# Patient Record
Sex: FEMALE | Race: WHITE
[De-identification: names, ages, dates, MRNs, and addresses within clinical notes are randomized per-mention and may not be internally consistent; named-entity substitution may affect disease eponyms.]

---

## 2021-07-20 ENCOUNTER — HOSPITAL ENCOUNTER (EMERGENCY)
Dept: HOSPITAL 75 - ER FS | Age: 26
Discharge: HOME | End: 2021-07-20
Payer: SELF-PAY

## 2021-07-20 VITALS — HEIGHT: 65 IN | BODY MASS INDEX: 25.64 KG/M2 | WEIGHT: 153.88 LBS

## 2021-07-20 VITALS — DIASTOLIC BLOOD PRESSURE: 70 MMHG | SYSTOLIC BLOOD PRESSURE: 124 MMHG

## 2021-07-20 DIAGNOSIS — H65.23: Primary | ICD-10-CM

## 2021-07-20 PROCEDURE — 99282 EMERGENCY DEPT VISIT SF MDM: CPT

## 2021-07-20 NOTE — ED EENT
History of Present Illness


General


Chief Complaint:  Ear Problems


Stated Complaint:  RT EAR PAIN


Nursing Triage Note:  


Patient presents to ED with parent reporting R ear pain since last night and 


causing lack of rest. Last medication used was Ibufrofen at 0200 and not 


relieved. Report hx of ear pain twice in last month and once treated with 


antibiotic and once no meds so refusing to return to PCP today.


Source:  patient





History of Present Illness


Date Seen by Provider:  Jul 20, 2021


Time Seen by Provider:  10:15


Initial Comments


Patient is a 25-year-old female who presents with intermittent bilateral ear 

pain for the past several weeks.  Patient has been evaluated by her PCP and and 

this ER.  She has been prescribed pain medications and antibiotics.  She states 

the pain will improve and return.  She reports dull right ear pain recurring 

last night.  She has taken ibuprofen with minimal relief.  Pain is rated as 

moderate and radiates to right mandible.  She has some nasal congestion and 

sinus drainage.  She denies otorrhea, sinus and dental tenderness and was 

evaluated by her dentist 1 week ago.


Timing/Duration:  gradual


Severity:  moderate


Location:  other


Prearrival Treatment:  other


Modifying Factors:  Improves With Other


Associated Symptoms:  other





Allergies and Home Medications


Patient Home Medication List


Home Medication List Reviewed:  Yes





Review of Systems


Review of Systems


Constitutional:  see HPI


Eyes:  See HPI


Ears:  See HPI


Nose:  see HPI


Mouth:  see HPI


Throat:  see HPI


Respiratory:  see HPI


Cardiovascular:  see HPI


Gastrointestinal:  see HPI


Musculoskeletal:  see HPI


Skin:  see HPI


Neurological:  See HPI


Hematologic/Lymphatic:  See HPI


Immunological/Allergic:  see HPI





Past Medical-Social-Family Hx


Patient Social History


Tobacco Use?:  No


Smoking Status:  Never a Smoker


Substance use?:  No


Alcohol Use?:  Yes


Alcohol Frequency:  Rarely


Pt feels they are or have been:  No





Past Medical History


Surgery/Hospitalization HX:  


Hx hyperactive thyroid


Last Menstrual Period:  Jul 16, 2021





Visual Acuity :  


   Eye Location:  Bilaterally


   Vision Acuity Degree:  


Physical Exam


Vital Signs





Vital Signs - First Documented








 7/20/21





 10:42


 


Temp 36.4


 


Pulse 92


 


Resp 16


 


B/P (MAP) 126/73 (90)


 


Pulse Ox 100


 


O2 Delivery Room Air








Height, Weight, BMI


Height: '"


Weight: lbs. oz. kg; 25.00 BMI


Method:


General Appearance:  moderate distress


Eyes:  bilateral eye normal inspection, bilateral eye PERRL, bilateral eye EOMI,

bilateral eye abnormal EOM, bilateral eye abnormal pupil, bilateral eye A-V 

nicking, bilateral eye conjunctival hemorrhage, bilateral eye conjunctival 

inflammation, bilateral eye conjunctivae pale, bilateral eye corneal abrasion


Ears:  left ear TM dull, left ear TM bulging, left ear other (blo)





Progress/Results/Core Measures


Results/Orders


Vital Signs/I&O











 7/20/21





 10:42


 


Temp 36.4


 


Pulse 92


 


Resp 16


 


B/P (MAP) 126/73 (90)


 


Pulse Ox 100


 


O2 Delivery Room Air














Blood Pressure Mean:                    90











Departure


Communication (Admissions)


Chronic otitis media with serous effusions.  Recommendations are supportive care

with PCP follow-up.  Return precautions reviewed.  Patient verbalizes 

understanding agreement discharge instructions prior to departure





Impression





   Primary Impression:  


   Chronic otitis media with serous effusion


Disposition:  01 HOME, SELF-CARE


Condition:  Stable





Departure-Patient Inst.


Decision time for Depature:  12:08


Referrals:  


[facility] (PCP)


Primary Care Physician








SAVANAH LUNA (Family)


Primary Care Physician


Patient Instructions:  Serous Otitis Media





Add. Discharge Instructions:  


Please take newly prescribed medications as directed and follow-up with your PCP

in 1 week for reevaluation.





All discharge instructions reviewed with patient and/or family. Voiced 

understanding.


Scripts


Fexofenadine/Pseudoephedrine (Allegra-D 24 Hour Tablet) 1 Each Tab.er.24h


1 EACH PO DAILY for 14 Days, TAB


   Prov: REHAN ACE DO         7/20/21 


Tramadol HCl (Tramadol HCl) 50 Mg Tablet


50 MG PO Q6H PRN for PAIN for 3 Days, #10 TAB 0 Refills


   Prov: REHAN ACE DO         7/20/21











REHAN ACE DO                   Jul 20, 2021 11:47

## 2021-09-17 ENCOUNTER — HOSPITAL ENCOUNTER (OUTPATIENT)
Dept: HOSPITAL 75 - CARD | Age: 26
End: 2021-09-17
Attending: NURSE PRACTITIONER
Payer: SELF-PAY

## 2021-09-17 DIAGNOSIS — R00.2: Primary | ICD-10-CM

## 2021-09-17 PROCEDURE — 93226 XTRNL ECG REC<48 HR SCAN A/R: CPT

## 2021-09-17 PROCEDURE — 93225 XTRNL ECG REC<48 HRS REC: CPT

## 2022-06-17 ENCOUNTER — HOSPITAL ENCOUNTER (EMERGENCY)
Dept: HOSPITAL 75 - ER FS | Age: 27
LOS: 1 days | Discharge: HOME | End: 2022-06-18
Payer: SELF-PAY

## 2022-06-17 VITALS — WEIGHT: 184.09 LBS | BODY MASS INDEX: 31.43 KG/M2 | HEIGHT: 63.98 IN

## 2022-06-17 DIAGNOSIS — R10.2: ICD-10-CM

## 2022-06-17 DIAGNOSIS — O26.891: Primary | ICD-10-CM

## 2022-06-17 PROCEDURE — 99282 EMERGENCY DEPT VISIT SF MDM: CPT

## 2022-06-17 PROCEDURE — 84703 CHORIONIC GONADOTROPIN ASSAY: CPT

## 2022-06-17 PROCEDURE — 81000 URINALYSIS NONAUTO W/SCOPE: CPT

## 2022-06-18 VITALS — DIASTOLIC BLOOD PRESSURE: 87 MMHG | SYSTOLIC BLOOD PRESSURE: 146 MMHG

## 2022-06-18 LAB
APTT PPP: YELLOW S
BACTERIA #/AREA URNS HPF: (no result) /HPF
BILIRUB UR QL STRIP: NEGATIVE
FIBRINOGEN PPP-MCNC: (no result) MG/DL
GLUCOSE UR STRIP-MCNC: NEGATIVE MG/DL
KETONES UR QL STRIP: NEGATIVE
LEUKOCYTE ESTERASE UR QL STRIP: NEGATIVE
NITRITE UR QL STRIP: NEGATIVE
PH UR STRIP: 6 [PH] (ref 5–9)
PROT UR QL STRIP: NEGATIVE
RBC #/AREA URNS HPF: (no result) /HPF
SP GR UR STRIP: >=1.03 (ref 1.02–1.02)
SQUAMOUS #/AREA URNS HPF: (no result) /HPF
WBC #/AREA URNS HPF: (no result) /HPF

## 2022-06-18 NOTE — ED GU-FEMALE
General


Chief Complaint:   - Reproductive


Stated Complaint:  LOWER ABDOMINAL PAIN/5 WEEK PREG


Nursing Triage Note:  


Patient states that she found out she was pregnant last week.  She reports 


feeling cramping that makes her uncomfortable.  Patient denies having any 


bleeding.  Patient reports that she thinks she is about 5 weeks pregnant.  


Patient would like to be checked out.


Source:  patient


Exam Limitations:  no limitations





History of Present Illness


Date Seen by Provider:  Jun 18, 2022


Time Seen by Provider:  00:10


Initial Comments


Patient is approximately 5 weeks pregnant reports occasional mitten cramping for

the past month.  Patient states she occasionally feels uncomfortable sharp pain.

 She currently denies pain or discomfort.  No urinary frequency urgency dysuria.

 No vaginal bleeding.  No flank pain.  No nausea or vomiting.  Patient did not 

take any Tylenol medication prior to arrival.  No other acute symptoms or 

complaints.


Timing/Duration:  week


Severity/Quality:  other


Location:  other


Radiation:  urethral


Activities at Onset:  other


Sexual Gene Aut[place] History:  other


Associated Symptoms:  other





Allergies and Home Medications


Allergies


Coded Allergies:  


     azithromycin (Verified  Allergy, Unknown, 6/18/22)





Patient Home Medication List


Home Medication List Reviewed:  Yes


Fexofenadine/Pseudoephedrine (Allegra-D 24 Hour Tablet) 1 Each Tab.er.24h, 1 

EACH PO DAILY


   Prescribed by: REHAN ACE on 7/20/21 1214


Ondansetron (Ondansetron Odt) 4 Mg Tab.rapdis, 4 MG PO Q6H


   Prescribed by: REHAN ACE on 7/20/21 1324


Tramadol HCl (Tramadol HCl) 50 Mg Tablet, 50 MG PO Q6H PRN for PAIN


   Prescribed by: REHAN ACE on 7/20/21 1214





Review of Systems


Review of Systems


Constitutional:  see HPI


Genitourinary:  see HPI





Past Medical-Social-Family Hx


Patient Social History


Tobacco Use?:  No


Substance use?:  No


Alcohol Use?:  No


Pt feels they are or have been:  No





Past Medical History


Surgery/Hospitalization HX:  


Hx hyperactive thyroid





Physical Exam


Vital Signs





Vital Signs - First Documented








 6/18/22





 00:04


 


Temp 37.1


 


Pulse 110


 


Resp 18


 


B/P (MAP) 146/87 (106)


 


O2 Delivery Room Air





Capillary Refill : Less Than 3 Seconds


Height, Weight, BMI


Height: '"


Weight: lbs. oz. kg; 31.00 BMI


Method:


General Appearance:  WD/WN, no apparent distress


Gastrointestinal:  non tender, soft





Focused Exam


Sepsis Stage:  Ruled Out





Progress/Results/Core Measures


Suspected Sepsis


SIRS


Temperature: 


Pulse: 110 


Respiratory Rate: 18


 


Blood Pressure 146 /87 


Mean: 106





Results/Orders


Lab Results





Laboratory Tests








Test


 6/18/22


00:10 Range/Units


 


 


Urine Color YELLOW   


 


Urine Clarity


 SLIGHTLY


CLOUDY  





 


Urine pH 6.0  5-9  


 


Urine Specific Gravity >=1.030  1.016-1.022  


 


Urine Protein NEGATIVE  NEGATIVE  


 


Urine Glucose (UA) NEGATIVE  NEGATIVE  


 


Urine Ketones NEGATIVE  NEGATIVE  


 


Urine Nitrite NEGATIVE  NEGATIVE  


 


Urine Bilirubin NEGATIVE  NEGATIVE  


 


Urine Urobilinogen 0.2  < = 1.0  MG/DL


 


Urine Leukocyte Esterase NEGATIVE  NEGATIVE  


 


Urine RBC (Auto) TRACE-I H NEGATIVE  


 


Urine RBC NONE   /HPF


 


Urine WBC 5-10 H  /HPF


 


Urine Squamous Epithelial


Cells 25-50 H


  /HPF





 


Urine Crystals NONE   /LPF


 


Urine Bacteria MODERATE H  /HPF


 


Urine Casts NONE   /LPF


 


Urine Mucus NEGATIVE   /LPF


 


Urine Culture Indicated NO   








My Orders





Orders - REHAN ACE DO


Ua Culture If Indicated (6/18/22 00:16)


Urinalysis (6/18/22 00:10)





Vital Signs/I&O











 6/18/22





 00:04


 


Temp 37.1


 


Pulse 110


 


Resp 18


 


B/P (MAP) 146/87 (106)


 


O2 Delivery Room Air





Capillary Refill : Less Than 3 Seconds








Blood Pressure Mean:                    106











Departure


Communication (Admissions)


No pelvic pain/tenderness on exam.  UA negative.  Recommendations are for 

supportive measures and PCP/OB follow-up.  Return precautions reviewed





Impression





   Primary Impression:  


   Pelvic cramping


   Additional Impression:  


   Early stage of pregnancy


Disposition:  01 HOME, SELF-CARE


Condition:  Stable





Departure-Patient Inst.


Decision time for Depature:  01:02


Referrals:  


[facility] (PCP)


Primary Care Physician








SAVANAH LUNA (Family)


Primary Care Physician


Patient Instructions:  Pelvic Pain





Add. Discharge Instructions:  


Please take Tylenol for cramping and apply heating pad if needed.  Follow-up 

with your PCP or OB early next week for reevaluation.  Return to the ED if new 

or worsening symptoms





All discharge instructions reviewed with patient and/or family. Voiced 

understanding.











REHAN ACE DO                   Jun 18, 2022 01:04

## 2022-11-03 ENCOUNTER — HOSPITAL ENCOUNTER (EMERGENCY)
Dept: HOSPITAL 75 - ER FS | Age: 27
Discharge: HOME | End: 2022-11-03
Payer: MEDICAID

## 2022-11-03 VITALS — SYSTOLIC BLOOD PRESSURE: 128 MMHG | DIASTOLIC BLOOD PRESSURE: 87 MMHG

## 2022-11-03 DIAGNOSIS — Z28.310: ICD-10-CM

## 2022-11-03 DIAGNOSIS — O21.9: ICD-10-CM

## 2022-11-03 DIAGNOSIS — R19.7: ICD-10-CM

## 2022-11-03 DIAGNOSIS — Z3A.26: ICD-10-CM

## 2022-11-03 DIAGNOSIS — O99.891: ICD-10-CM

## 2022-11-03 DIAGNOSIS — R05.1: ICD-10-CM

## 2022-11-03 DIAGNOSIS — R00.0: ICD-10-CM

## 2022-11-03 DIAGNOSIS — O98.512: Primary | ICD-10-CM

## 2022-11-03 DIAGNOSIS — Z20.822: ICD-10-CM

## 2022-11-03 DIAGNOSIS — J02.9: ICD-10-CM

## 2022-11-03 PROCEDURE — 87636 SARSCOV2 & INF A&B AMP PRB: CPT

## 2022-11-03 NOTE — ED GI
General


Chief Complaint:  Abdominal/GI Problems


Stated Complaint:  VOMITING


Source of Information:  Patient


Exam Limitations:  No Limitations





History of Present Illness


Date Seen by Provider:  Nov 3, 2022


Time Seen by Provider:  10:33


Initial Comments


27-year-old female  at 27 WGA coming in due to 3 days of cough, sore throat,

elevated temperature, nausea, and vomiting.  Went to her primary doctor 

yesterday and had a negative strep test.  Had a negative at home COVID test as 

well.  Has not had anything for fever or pain as of yet today.  Has not had any 

complications with the pregnancy, no vaginal bleeding, feels baby moving, no 

contractions, and has had normal work-up thus far.  One of her friends did have 

strep throat recently she believes.  She is tolerating fluids at home but is 

drinking slightly less.  She is otherwise denying any other acute complaints.





Allergies and Home Medications


Allergies


Coded Allergies:  


     azithromycin (Verified  Allergy, Unknown, 22)





Patient Home Medication List


Home Medication List Reviewed:  Yes


Fexofenadine/Pseudoephedrine (Allegra-D 24 Hour Tablet) 1 Each Tab.er.24h, 1 

EACH PO DAILY


   Prescribed by: REHAN ACE on 21 1214


Ondansetron (Ondansetron Odt) 4 Mg Tab.rapdis, 4 MG PO Q6H


   Prescribed by: REHAN ACE on 21 1324


Tramadol HCl (Tramadol HCl) 50 Mg Tablet, 50 MG PO Q6H PRN for PAIN


   Prescribed by: REHAN ACE on 21 1214





Review of Systems


Review of Systems


Constitutional:  malaise


EENTM:  No Blurred Vision; Other


Respiratory:  Cough


Cardiovascular:  Denies Chest Pain


Gastrointestinal:  Denies Abdominal Pain; Diarrhea, Nausea


Genitourinary:  No Symptoms Reported


Musculoskeletal:  no symptoms reported


Skin:  no symptoms reported


Psychiatric/Neurological:  No Symptoms Reported


Endocrine:  No Symptoms Reported


Hematologic/Lymphatic:  No Symptoms Reported





All Other Systems Reviewed


Negative Unless Noted:  Yes





Past Medical-Social-Family Hx


Patient Social History


Tobacco Use?:  No





Past Medical History


Surgery/Hospitalization HX:  


Hx hyperactive thyroid


Surgeries:  No





Physical Exam


Vital Signs





Vital Signs - First Documented








 11/3/22





 10:40


 


Temp 36.3


 


Pulse 126


 


Resp 16


 


B/P (MAP) 128/87 (101)


 


Pulse Ox 98


 


O2 Delivery Room Air





Capillary Refill :


Height/Weight/BMI


Height: '"


Weight: lbs. oz. kg; 31.00 BMI


Method:


General Appearance:  WD/WN, no apparent distress


HEENT:  PERRL/EOMI, normal ENT inspection, pharyngeal erythema; No tonsillar 

exudate


Neck:  non-tender, full range of motion, supple, normal inspection


Respiratory:  chest non-tender, lungs clear, normal breath sounds, no 

respiratory distress, no accessory muscle use


Cardiovascular:  regular rate, rhythm, no edema, no murmur


Gastrointestinal:  normal bowel sounds, non tender, soft; No distended, No 

guarding, No rebound; other


Extremities:  normal range of motion, non-tender, normal inspection, no pedal 

edema, no calf tenderness, normal capillary refill


Back:  normal inspection, no CVA tenderness, no vertebral tenderness


Neurologic/Psychiatric:  no motor/sensory deficits, alert, normal mood/affect


Skin:  normal color, warm/dry


Lymphatic:  no adenopathy





Progress/Results/Core Measures


Results/Orders


Lab Results





Laboratory Tests








Test


 11/3/22


10:47 Range/Units


 


 


Influenza Type A (RT-PCR) Not Detected  Not Detecte  


 


Influenza Type B (RT-PCR) Not Detected  Not Detecte  


 


SARS-CoV-2 RNA (RT-PCR) Not Detected  Not Detecte  








My Orders





Orders - ABILIO RUIZ MD


Influenza A And B By Pcr (11/3/22 10:45)


Covid 19 Inhouse Test (11/3/22 10:45)


Ondansetron  Oral Dissolve Tab (Zofran (11/3/22 10:45)


Famotidine Tablet (Pepcid Tablet) (11/3/22 10:45)


Acetaminophen  Tablet (Tylenol  Tablet) (11/3/22 11:00)


Acetaminophen  Tablet (Tylenol  Tablet) (11/3/22 10:49)


Lactated Ringers (Lr 1000 Ml Iv Solution (11/3/22 11:15)





Medications Given in ED





Current Medications








 Medications  Dose


 Ordered  Sig/Lali


 Route  Start Time


 Stop Time Status Last Admin


Dose Admin


 


 Acetaminophen  1,000 mg  ONCE  ONCE


 PO  11/3/22 11:00


 11/3/22 11:01 DC 11/3/22 11:17


1,000 MG








Vital Signs/I&O











 11/3/22





 10:40


 


Temp 36.3


 


Pulse 126


 


Resp 16


 


B/P (MAP) 128/87 (101)


 


Pulse Ox 98


 


O2 Delivery Room Air











Progress


Progress Note :  


Progress Note


27-year-old female with above history presenting for viral symptoms including 

cough, sore throat, nausea, diarrhea.  ABCs were intact and vitals were stable 

on presentation although she is mildly tachycardic.  She states over the past 

couple days she has not been eating and drinking as well.  She was given oral 

Zofran and Pepcid.  Able to drink some water with it, but does have some dry 

heaving.  An IV was then placed and she was given a bolus of IV fluids.  I did a

point-of-care ultrasound and her fetal heart rate is in the 150s with fetal 

movement as well.  Flu and COVID test were negative.  Patient's exam is 

reassuring including a soft and nontender abdomen.  I believe she stable for 

discharge with outpatient follow-up.  She was sent home with strict return 

precautions





Departure


Impression





   Primary Impression:  


   Vomiting in adult


   Additional Impressions:  


   Viral syndrome


   Pregnancy


   Qualified Codes:  Z3A.26 - 26 weeks gestation of pregnancy


Disposition:   HOME, SELF-CARE


Condition:  Stable





Departure-Patient Inst.


Decision time for Depature:  11:40


Referrals:  


CELINA REHMAN MD (PCP)


Primary Care Physician


Patient Instructions:  Nausea and Vomiting, Adult ED





Add. Discharge Instructions:  


You likely do have a virus that is going around town right now.  Most people are

getting better in about a week.  Take frequent small sips of fluids.  Take the 

Zofran as needed for nausea.  A refill of your albuterol was sent to your 

pharmacy as well.  Follow-up with your regular doctor if you are not improving 

or if they have any other concerns emergently please come to the ER.


Scripts


Ondansetron (Ondansetron Odt) 4 Mg Tab.rapdis


4 MG SL Q6H PRN for NAUSEA/VOMITING for 5 Days, #20 TAB


   Prov: ABILIO RUIZ MD         11/3/22 


Albuterol Sulfate (Ventolin Hfa) 90 Mcg Hfa.aer.ad


18 GM INH Q4H PRN for WHEEZING for 30 Days, #1 EA


   Prov: ABILIO RUIZ MD         11/3/22


Work/School Note:  Work Release Form   Date Seen in the Emergency Department:  

Nov 3, 2022


   Return to Work:  2022


   Restrictions:  Return-No Fever (24hrs), Return-No Vomiting(24hrs)











ABILIO RUIZ MD           Nov 3, 2022 10:51

## 2022-12-11 ENCOUNTER — HOSPITAL ENCOUNTER (EMERGENCY)
Dept: HOSPITAL 75 - ER FS | Age: 27
Discharge: HOME | End: 2022-12-11
Payer: MEDICAID

## 2022-12-11 ENCOUNTER — HOSPITAL ENCOUNTER (OUTPATIENT)
Dept: HOSPITAL 75 - LDRP | Age: 27
LOS: 1 days | End: 2022-12-12
Attending: FAMILY MEDICINE
Payer: MEDICAID

## 2022-12-11 VITALS — SYSTOLIC BLOOD PRESSURE: 129 MMHG | DIASTOLIC BLOOD PRESSURE: 63 MMHG

## 2022-12-11 VITALS — WEIGHT: 187.39 LBS | BODY MASS INDEX: 32.39 KG/M2 | HEIGHT: 63.78 IN

## 2022-12-11 VITALS — DIASTOLIC BLOOD PRESSURE: 63 MMHG | SYSTOLIC BLOOD PRESSURE: 129 MMHG

## 2022-12-11 VITALS — DIASTOLIC BLOOD PRESSURE: 75 MMHG | SYSTOLIC BLOOD PRESSURE: 135 MMHG

## 2022-12-11 VITALS — DIASTOLIC BLOOD PRESSURE: 65 MMHG | SYSTOLIC BLOOD PRESSURE: 119 MMHG

## 2022-12-11 VITALS — BODY MASS INDEX: 32.74 KG/M2 | HEIGHT: 64.02 IN | WEIGHT: 191.8 LBS

## 2022-12-11 DIAGNOSIS — O36.8130: Primary | ICD-10-CM

## 2022-12-11 DIAGNOSIS — E86.0: ICD-10-CM

## 2022-12-11 DIAGNOSIS — Z3A.31: ICD-10-CM

## 2022-12-11 DIAGNOSIS — O26.893: ICD-10-CM

## 2022-12-11 LAB
ALBUMIN SERPL-MCNC: 3.5 GM/DL (ref 3.2–4.5)
ALP SERPL-CCNC: 128 U/L (ref 40–136)
ALT SERPL-CCNC: 7 U/L (ref 0–55)
APTT PPP: YELLOW S
BACTERIA #/AREA URNS HPF: (no result) /HPF
BASOPHILS # BLD AUTO: 0 10^3/UL (ref 0–0.1)
BASOPHILS NFR BLD AUTO: 0 % (ref 0–10)
BILIRUB SERPL-MCNC: 0.2 MG/DL (ref 0.1–1)
BILIRUB UR QL STRIP: NEGATIVE
BUN/CREAT SERPL: 11
CALCIUM SERPL-MCNC: 9.2 MG/DL (ref 8.5–10.1)
CHLORIDE SERPL-SCNC: 104 MMOL/L (ref 98–107)
CO2 SERPL-SCNC: 22 MMOL/L (ref 21–32)
CREAT SERPL-MCNC: 0.53 MG/DL (ref 0.6–1.3)
EOSINOPHIL # BLD AUTO: 0.1 10^3/UL (ref 0–0.3)
EOSINOPHIL NFR BLD AUTO: 1 % (ref 0–10)
FIBRINOGEN PPP-MCNC: CLEAR MG/DL
GFR SERPLBLD BASED ON 1.73 SQ M-ARVRAT: 131 ML/MIN
GLUCOSE SERPL-MCNC: 110 MG/DL (ref 70–105)
GLUCOSE UR STRIP-MCNC: NEGATIVE MG/DL
HCT VFR BLD CALC: 30 % (ref 35–52)
HGB BLD-MCNC: 10.2 G/DL (ref 11.5–16)
KETONES UR QL STRIP: NEGATIVE
LEUKOCYTE ESTERASE UR QL STRIP: NEGATIVE
LIPASE SERPL-CCNC: 24 U/L (ref 8–78)
LYMPHOCYTES # BLD AUTO: 2.9 10^3/UL (ref 1–4)
LYMPHOCYTES NFR BLD AUTO: 27 % (ref 12–44)
MANUAL DIFFERENTIAL PERFORMED BLD QL: NO
MCH RBC QN AUTO: 29 PG (ref 25–34)
MCHC RBC AUTO-ENTMCNC: 34 G/DL (ref 32–36)
MCV RBC AUTO: 85 FL (ref 80–99)
MONOCYTES # BLD AUTO: 0.6 10^3/UL (ref 0–1)
MONOCYTES NFR BLD AUTO: 6 % (ref 0–12)
NEUTROPHILS # BLD AUTO: 7.1 10^3/UL (ref 1.8–7.8)
NEUTROPHILS NFR BLD AUTO: 65 % (ref 42–75)
NITRITE UR QL STRIP: NEGATIVE
PH UR STRIP: 6.5 [PH] (ref 5–9)
PLATELET # BLD: 308 10^3/UL (ref 130–400)
PMV BLD AUTO: 10.5 FL (ref 9–12.2)
POTASSIUM SERPL-SCNC: 3.7 MMOL/L (ref 3.6–5)
PROT SERPL-MCNC: 6.8 GM/DL (ref 6.4–8.2)
PROT UR QL STRIP: NEGATIVE
RBC #/AREA URNS HPF: (no result) /HPF
SODIUM SERPL-SCNC: 137 MMOL/L (ref 135–145)
SP GR UR STRIP: <=1.005 (ref 1.02–1.02)
WBC # BLD AUTO: 10.8 10^3/UL (ref 4.3–11)
WBC #/AREA URNS HPF: (no result) /HPF

## 2022-12-11 PROCEDURE — 85025 COMPLETE CBC W/AUTO DIFF WBC: CPT

## 2022-12-11 PROCEDURE — 81000 URINALYSIS NONAUTO W/SCOPE: CPT

## 2022-12-11 PROCEDURE — 83690 ASSAY OF LIPASE: CPT

## 2022-12-11 PROCEDURE — 36415 COLL VENOUS BLD VENIPUNCTURE: CPT

## 2022-12-11 PROCEDURE — 80053 COMPREHEN METABOLIC PANEL: CPT

## 2022-12-11 NOTE — ED GU-FEMALE
General


Chief Complaint:  OB > 20 WEEKS


Stated Complaint:  POSSIBLE CONTRACTIONS


Nursing Triage Note:  


Patient presented to the ER tonight with complaints of contractions, states her 


stomach feels hard and that she has felt decreased movement.


Source:  patient


Exam Limitations:  no limitations





History of Present Illness


Date Seen by Provider:  Dec 11, 2022


Time Seen by Provider:  21:20


Initial Comments


27-year-old female that is  at 31w3d EGA seen by Dr. Rehman for her OB care 

coming in due to decreased fetal movement and concerned she could be 

terry.  She states she is never felt contractions before, but feels like 

her abdomen is hard.  She says that uncomfortable, no severe pain.  She is 

unable to say if there is really any timing in between the episodes, more so 

that its been constant all day.  Has not had any Tylenol.  Eating and drinking 

normally.  Denies any vaginal bleeding or loss of fluids like her water has 

broken.  Otherwise denying any other acute complaints.  Has had a healthy 

pregnancy so far.





Allergies and Home Medications


Allergies


Coded Allergies:  


     azithromycin (Verified  Allergy, Unknown, 22)





Patient Home Medication List


Home Medication List Reviewed:  Yes


Albuterol Sulfate (Ventolin Hfa) 90 Mcg Hfa.aer.ad, 18 GM INH Q4H PRN for 

WHEEZING


   Prescribed by: ABILIO RUIZ on 11/3/22 1129


Fexofenadine/Pseudoephedrine (Allegra-D 24 Hour Tablet) 1 Each Tab.er.24h, 1 

EACH PO DAILY


   Prescribed by: REHAN ACE on 21 1214


Ondansetron (Ondansetron Odt) 4 Mg Tab.rapdis, 4 MG PO Q6H


   Prescribed by: REHAN ACE on 21 1324


Ondansetron (Ondansetron Odt) 4 Mg Tab.rapdis, 4 MG SL Q6H PRN for NAUSEA

/VOMITING


   Prescribed by: ABILIO RUIZ on 11/3/22 1129


Tramadol HCl (Tramadol HCl) 50 Mg Tablet, 50 MG PO Q6H PRN for PAIN


   Prescribed by: REHAN ACE on 21 1214





Review of Systems


Review of Systems


Constitutional:  No fever


EENTM:  no symptoms reported


Respiratory:  no symptoms reported


Cardiovascular:  no symptoms reported


Gastrointestinal:  no symptoms reported


Genitourinary:  frequency


Expected Date of Delivery:  2023


Musculoskeletal:  no symptoms reported


Skin:  no symptoms reported


Psychiatric/Neurological:  No Symptoms Reported


Endocrine:  No Symptoms Reported


Hematologic/Lymphatic:  No Symptoms Reported





All Other Systemes Reviewed


Negative Unless Noted:  Yes





Past Medical-Social-Family Hx


Patient Social History


Tobacco Use?:  No


Substance use?:  No


Alcohol Use?:  No





Past Medical History


Surgery/Hospitalization HX:  


Hx hyperactive thyroid


Surgeries:  No


Expected Date of Delivery:  2023





Physical Exam


Vital Signs





Vital Signs - First Documented








 22





 21:29


 


Temp 36.1


 


Pulse 115


 


Resp 14


 


B/P (MAP) 135/87 (103)


 


Pulse Ox 99


 


O2 Delivery Room Air





Capillary Refill : Less Than 3 Seconds


Height, Weight, BMI


Height: '"


Weight: lbs. oz. kg; 32.00 BMI


Method:


General Appearance:  WD/WN, no apparent distress


HEENT:  PERRL/EOMI, normal ENT inspection, pharynx normal


Neck:  non-tender, full range of motion, supple, normal inspection


Cardiovascular:  regular rate, rhythm, no edema, no murmur


Respiratory:  chest non-tender, lungs clear, normal breath sounds, no 

respiratory distress, no accessory muscle use


Gastrointestinal:  normal bowel sounds, non tender, soft; No guarding; other 

(Gravid, uterus above umbilicus)


Back:  normal inspection


Extremities:  normal range of motion, non-tender, normal inspection, no pedal e

ivon, no calf tenderness, normal capillary refill


Neurologic/Psychiatric:  no motor/sensory deficits, alert, normal mood/affect


Skin:  normal color, warm/dry


Lymphatic:  no adenopathy





Progress/Results/Core Measures


Suspected Sepsis


SIRS


Temperature: 


Pulse: 115 


Respiratory Rate: 14


 


Laboratory Tests


22 21:45: White Blood Count 10.8


Blood Pressure 135 /87 


Mean: 103


 











Laboratory Tests


22 21:45: Platelet Count 308





Results/Orders


Lab Results





Laboratory Tests








Test


 22


21:39 22


21:45 Range/Units


 


 


Urine Color YELLOW    


 


Urine Clarity CLEAR    


 


Urine pH 6.5   5-9  


 


Urine Specific Gravity <=1.005   1.016-1.022  


 


Urine Protein NEGATIVE   NEGATIVE  


 


Urine Glucose (UA) NEGATIVE   NEGATIVE  


 


Urine Ketones NEGATIVE   NEGATIVE  


 


Urine Nitrite NEGATIVE   NEGATIVE  


 


Urine Bilirubin NEGATIVE   NEGATIVE  


 


Urine Urobilinogen 0.2   < = 1.0  MG/DL


 


Urine Leukocyte Esterase NEGATIVE   NEGATIVE  


 


Urine RBC (Auto) NEGATIVE   NEGATIVE  


 


Urine RBC 2-5 H   /HPF


 


Urine WBC NONE    /HPF


 


Urine Squamous Epithelial


Cells 10-25 H


 


  /HPF





 


Urine Crystals NONE    /LPF


 


Urine Bacteria TRACE    /HPF


 


Urine Casts NONE    /LPF


 


Urine Mucus NEGATIVE    /LPF


 


Urine Culture Indicated NO    


 


White Blood Count


 


 10.8 


 4.3-11.0


10^3/uL


 


Red Blood Count


 


 3.58 L


 3.80-5.11


10^6/uL


 


Hemoglobin  10.2 L 11.5-16.0  g/dL


 


Hematocrit  30 L 35-52  %


 


Mean Corpuscular Volume  85  80-99  fL


 


Mean Corpuscular Hemoglobin  29  25-34  pg


 


Mean Corpuscular Hemoglobin


Concent 


 34 


 32-36  g/dL





 


Red Cell Distribution Width  14.2  10.0-14.5  %


 


Platelet Count


 


 308 


 130-400


10^3/uL


 


Mean Platelet Volume  10.5  9.0-12.2  fL


 


Immature Granulocyte % (Auto)  1   %


 


Neutrophils (%) (Auto)  65  42-75  %


 


Lymphocytes (%) (Auto)  27  12-44  %


 


Monocytes (%) (Auto)  6  0-12  %


 


Eosinophils (%) (Auto)  1  0-10  %


 


Basophils (%) (Auto)  0  0-10  %


 


Neutrophils # (Auto)


 


 7.1 


 1.8-7.8


10^3/uL


 


Lymphocytes # (Auto)


 


 2.9 


 1.0-4.0


10^3/uL


 


Monocytes # (Auto)


 


 0.6 


 0.0-1.0


10^3/uL


 


Eosinophils # (Auto)


 


 0.1 


 0.0-0.3


10^3/uL


 


Basophils # (Auto)


 


 0.0 


 0.0-0.1


10^3/uL


 


Immature Granulocyte # (Auto)


 


 0.1 


 0.0-0.1


10^3/uL








My Orders





Orders - ABILIO RUIZ MD


Ed Iv/Invasive Line Start (22 21:34)


Lactated Ringers (Lr 1000 Ml Iv Solution (22 21:45)


Cbc With Automated Diff (22 21:34)


Comprehensive Metabolic Panel (22 21:34)


Lipase (22 21:34)


Ua Culture If Indicated (22 21:34)


Acetaminophen  Tablet (Tylenol  Tablet) (22 21:45)





Medications Given in ED





Current Medications








 Medications  Dose


 Ordered  Sig/Lali


 Route  Start Time


 Stop Time Status Last Admin


Dose Admin


 


 Acetaminophen  1,000 mg  ONCE  ONCE


 PO  22 21:45


 22 21:46 DC 22 21:52


1,000 MG


 


 Lactated Ringer's  1,000 ml @ 


 0 mls/hr  Q0M ONCE


 IV  22 21:45


 22 21:46 DC 22 21:52


0 MLS/HR








Vital Signs/I&O











 22





 21:29 21:52


 


Temp 36.1 36.1


 


Pulse 115 


 


Resp 14 


 


B/P (MAP) 135/87 (103) 


 


Pulse Ox 99 


 


O2 Delivery Room Air 





Capillary Refill : Less Than 3 Seconds








Blood Pressure Mean:                    103








Progress Note :  


Progress Note


27-year-old female with above history coming in due to concerns for decreased 

fetal movement and wondering if she is having contractions.  ABCs were intact 

and vitals were stable on presentation.  Physical exam with a gravid uterus, no 

tenderness on exam.  I did a point-of-care ultrasound showing a fetal heart rate

of 147, I do see fetal movement.  She does not have any loss of fluids like her 

water broke, and no vaginal bleeding.  An IV was placed and she will be given a 

bolus of IV fluids as well as Tylenol to see if we can help with the abdominal t

ightness that she is feeling.  Basic labs including LFTs and lipase also ordered

with urinalysis.


I contacted OB in [facility] and gave report. They will evaluate the 

patient on arrival there.  Offer the patient an ambulance, and she would like 

her significant other to drive her.  Given she does not subjectively appear like

she is in active labor, I believe this is okay.





Of note, patient does have trace bacteria in her urine.  I discussed that we do 

recommend treating this in pregnancy.  Antibiotics sent to her pharmacy.





Departure


Impression





   Primary Impression:  


   Pregnant


   Qualified Codes:  Z3A.31 - 31 weeks gestation of pregnancy


   Additional Impression:  


   Decreased fetal movement


   Qualified Codes:  O36.8130 - Decreased fetal movements, third trimester, not 

   applicable or unspecified


Disposition:  01 HOME, SELF-CARE


Condition:  Stable





Departure-Patient Inst.


Decision time for Depature:  21:47


Referrals:  


CELINA REHMAN MD (PCP)


Primary Care Physician








SAVANAH LUNA (Family)


Primary Care Physician


Patient Instructions:  Fetal Movement





Add. Discharge Instructions:  


Please drive directly to the ER in [place].  You can say they are expecting 

you up in OB due to concerns for potential labor.  They should direct you 

directly upstairs.  If you feel like you are going into labor while your 

significant other is driving you, please stop the car and call 911.


Scripts


Nitrofurantoin Macrocrystal (Nitrofurantoin) 100 Mg Capsule


100 MG PO BID for 5 Days, #10 CAP 0 Refills


   Prov: ABILIO RUIZ MD         22











ABILIO RUIZ MD          Dec 11, 2022 21:40

## 2022-12-12 VITALS — DIASTOLIC BLOOD PRESSURE: 70 MMHG | SYSTOLIC BLOOD PRESSURE: 121 MMHG

## 2022-12-12 NOTE — PHYSICIAN QUERY-FINAL DX
WHEAT,ASHLEY 12/12/22 0902:


Clinic Account Progress/Dx


Physician Query:


Please give diagnosis





Please include # weeks gestation


Date of Service





Dec 11, 2022 at 22:53





THOR KIM DO 12/12/22 2007:


Clinic Account Progress/Dx


DIAGNOSIS:


Diagnosis


31 wk GA


dehydration


decreased fetal movement with reassuring fetal heart tones.











WHEAT,ASHLEY                      Dec 12, 2022 09:02


THOR KIM DO                Dec 12, 2022 20:07

## 2023-01-17 ENCOUNTER — HOSPITAL ENCOUNTER (OUTPATIENT)
Dept: HOSPITAL 75 - SDC | Age: 28
Discharge: HOME | End: 2023-01-17
Attending: FAMILY MEDICINE
Payer: MEDICAID

## 2023-01-17 VITALS — HEIGHT: 63.98 IN | WEIGHT: 191.8 LBS | BODY MASS INDEX: 32.74 KG/M2

## 2023-01-17 VITALS — DIASTOLIC BLOOD PRESSURE: 78 MMHG | SYSTOLIC BLOOD PRESSURE: 114 MMHG

## 2023-01-17 DIAGNOSIS — Z3A.00: ICD-10-CM

## 2023-01-17 DIAGNOSIS — O99.013: Primary | ICD-10-CM

## 2023-01-17 PROCEDURE — 96365 THER/PROPH/DIAG IV INF INIT: CPT

## 2023-01-26 ENCOUNTER — HOSPITAL ENCOUNTER (OUTPATIENT)
Dept: HOSPITAL 75 - SDC | Age: 28
End: 2023-01-26
Attending: FAMILY MEDICINE
Payer: MEDICAID

## 2023-01-26 VITALS — DIASTOLIC BLOOD PRESSURE: 71 MMHG | SYSTOLIC BLOOD PRESSURE: 108 MMHG

## 2023-01-26 VITALS — WEIGHT: 190.7 LBS | HEIGHT: 64.02 IN | BODY MASS INDEX: 32.56 KG/M2

## 2023-01-26 DIAGNOSIS — O99.013: Primary | ICD-10-CM

## 2023-01-26 DIAGNOSIS — Z3A.00: ICD-10-CM

## 2023-01-26 PROCEDURE — 96365 THER/PROPH/DIAG IV INF INIT: CPT

## 2023-02-13 ENCOUNTER — HOSPITAL ENCOUNTER (INPATIENT)
Dept: HOSPITAL 75 - LDRP | Age: 28
LOS: 3 days | Discharge: HOME | End: 2023-02-16
Attending: FAMILY MEDICINE | Admitting: FAMILY MEDICINE
Payer: MEDICAID

## 2023-02-13 VITALS — DIASTOLIC BLOOD PRESSURE: 69 MMHG | SYSTOLIC BLOOD PRESSURE: 112 MMHG

## 2023-02-13 VITALS — DIASTOLIC BLOOD PRESSURE: 65 MMHG | SYSTOLIC BLOOD PRESSURE: 108 MMHG

## 2023-02-13 VITALS — HEIGHT: 65 IN | BODY MASS INDEX: 32.25 KG/M2 | WEIGHT: 193.57 LBS

## 2023-02-13 VITALS — SYSTOLIC BLOOD PRESSURE: 118 MMHG | DIASTOLIC BLOOD PRESSURE: 72 MMHG

## 2023-02-13 VITALS — DIASTOLIC BLOOD PRESSURE: 71 MMHG | SYSTOLIC BLOOD PRESSURE: 116 MMHG

## 2023-02-13 VITALS — SYSTOLIC BLOOD PRESSURE: 119 MMHG | DIASTOLIC BLOOD PRESSURE: 75 MMHG

## 2023-02-13 VITALS — DIASTOLIC BLOOD PRESSURE: 66 MMHG | SYSTOLIC BLOOD PRESSURE: 109 MMHG

## 2023-02-13 VITALS — SYSTOLIC BLOOD PRESSURE: 126 MMHG | DIASTOLIC BLOOD PRESSURE: 86 MMHG

## 2023-02-13 DIAGNOSIS — Z28.310: ICD-10-CM

## 2023-02-13 DIAGNOSIS — Z3A.40: ICD-10-CM

## 2023-02-13 DIAGNOSIS — D62: ICD-10-CM

## 2023-02-13 DIAGNOSIS — O48.0: Primary | ICD-10-CM

## 2023-02-13 LAB
BASOPHILS # BLD AUTO: 0 10^3/UL (ref 0–0.1)
BASOPHILS NFR BLD AUTO: 1 % (ref 0–10)
EOSINOPHIL # BLD AUTO: 0.1 10^3/UL (ref 0–0.3)
EOSINOPHIL NFR BLD AUTO: 1 % (ref 0–10)
HCT VFR BLD CALC: 32 % (ref 35–52)
HGB BLD-MCNC: 10.5 G/DL (ref 11.5–16)
LYMPHOCYTES # BLD AUTO: 2.2 10^3/UL (ref 1–4)
LYMPHOCYTES NFR BLD AUTO: 25 % (ref 12–44)
MANUAL DIFFERENTIAL PERFORMED BLD QL: NO
MCH RBC QN AUTO: 29 PG (ref 25–34)
MCHC RBC AUTO-ENTMCNC: 33 G/DL (ref 32–36)
MCV RBC AUTO: 86 FL (ref 80–99)
MONOCYTES # BLD AUTO: 0.5 10^3/UL (ref 0–1)
MONOCYTES NFR BLD AUTO: 6 % (ref 0–12)
NEUTROPHILS # BLD AUTO: 5.9 10^3/UL (ref 1.8–7.8)
NEUTROPHILS NFR BLD AUTO: 67 % (ref 42–75)
PLATELET # BLD: 219 10^3/UL (ref 130–400)
PMV BLD AUTO: 11.9 FL (ref 9–12.2)
WBC # BLD AUTO: 8.8 10^3/UL (ref 4.3–11)

## 2023-02-13 PROCEDURE — 36415 COLL VENOUS BLD VENIPUNCTURE: CPT

## 2023-02-13 PROCEDURE — 86901 BLOOD TYPING SEROLOGIC RH(D): CPT

## 2023-02-13 PROCEDURE — 86850 RBC ANTIBODY SCREEN: CPT

## 2023-02-13 PROCEDURE — 86900 BLOOD TYPING SEROLOGIC ABO: CPT

## 2023-02-13 PROCEDURE — 85025 COMPLETE CBC W/AUTO DIFF WBC: CPT

## 2023-02-13 PROCEDURE — 86780 TREPONEMA PALLIDUM: CPT

## 2023-02-13 RX ADMIN — Medication SCH ML: at 22:00

## 2023-02-13 RX ADMIN — SODIUM CHLORIDE, SODIUM LACTATE, POTASSIUM CHLORIDE, CALCIUM CHLORIDE, AND DEXTROSE MONOHYDRATE SCH MLS/HR: 600; 310; 30; 20; 5 INJECTION, SOLUTION INTRAVENOUS at 20:42

## 2023-02-13 NOTE — XMS REPORT
Summarization of Episode Note

                             Created on: 2023



Fabiola Juarez MATT

External Reference #: [id number]

: 1995

Sex: Female



Demographics





                          Address                   [address] [place]  [address]

 

                          Home Phone                [phone]

 

                          Preferred Language        Unknown

 

                          Marital Status            unmarried

 

                          Pentecostalism Affiliation     Unknown

 

                          Race                      White

 

                          Ethnic Group              Not  or 





Author





                          Author                    [facility]

 

                          Address                   Unknown

 

                          Phone                     Unavailable







Support





                Name            Relationship    Address         Phone

 

                    Fabiola Juarez     GUAR                [address] [place]  [address] [phone]

 

                    Theodora Juarez     ECON                [address] [place]  [address] [phone]







Care Team Providers





                    Care Team Member Name Role                Phone

 

                    JOSE  KALYN      Unavailable         [phone]







PROBLEMS





          Type      Condition ICD9-CM Code URJ81-XH Code Onset Dates Condition S

tatus W/U 

Status              Risk                SNOMED Code         Notes

 

       Problem Prenatal care in third trimester        Z34.93               conf

irmed        [id number]  

 

                Problem         Gastroesophageal reflux disease, esophagitis pre

sence not specified                 

K21.9                            confirmed             [id number]   

 

       Problem Thyroid cyst        E04.1                confirmed        [id number]

4  

 

       Problem Hypothyroidism, unspecified        E03.9                confirmed

        [id number]  

 

                          Problem                   Endocrine, nutritional and m

etabolic diseases complicating pregnancy, 

unspecified trimester         O99.280                 confirmed         [id number]

2  

 

       Problem Anemia during pregnancy in third trimester        O99.013        

       confirmed                

 

       Problem Hyperthyroidism        E05.90               confirmed        3448

6009  

 

        Problem Seasonal allergic rhinitis, unspecified trigger         J30.2   

                confirmed         

[id number]                                

 

          Problem   Gastroesophageal reflux disease without esophagitis         

  K21.9                         

confirmed                               [id number]            

 

             Problem      Thyrotoxicosis, unspecified without thyrotoxic crisis 

or storm              E05.90       

                          confirmed                 [id number]      







ALLERGIES





                    Allergen (clinical drug ingredient) Drug/Non Drug Allergy do

cumented on EMR 

Reaction            Allergy Type        Onset Date          Status

 

           azithromycin Azithromycin(NDC Code:[address]-19) hives      Drug All

ergy            Active







ENCOUNTERS from 1996 to 2023





             Encounter    Location     Date         Provider     Diagnosis

 

                          [facility] [address]

[id number] 

[address]                  KALYN GARCIA         







IMMUNIZATIONS





                Vaccine         Route           Administration Date Status

 

                Influenza (split), preservative free, 6-35 months Unknown       

  Dec 05, 2003    

Administered

 

                STATE FUNDED GARDASIL 9 (HPV) IM Intramuscular Oct 25, 2019    A

dministered

 

                PRIVATE GARDASIL 9 (HPV) IM Intramuscular 2019    Admini

stered

 

                PRIVATE GARDASIL 9 (HPV) IM Intramuscular 2019  Admini

stered

 

                mmr-II (history) Unknown         2000   Administered

 

                Influenza (split), preservative free, 6-35 months Unknown       

  2007    

Administered

 

                Influenza (split), preservative free, 6-35 months Unknown       

  Oct 15, 2004    

Administered

 

                Influenza (split), preservative free, 6-35 months Unknown       

  2004    

Administered

 

                DTP             Unknown         1996    Administered

 

                PRIVATE TDAP (BOOSTRIX) IM Intramuscular 2022    Adminis

tered

 

                hepatitis b pediatric (history) Unknown         1996    

Administered

 

                hepatitis b pediatric (history) Unknown         1996   

Administered

 

                OPV             Unknown         Dec 01, 1997    Administered

 

                OPV             Unknown         1997    Administered

 

                OPV             Unknown         1996    Administered

 

                DTP             Unknown         Dec 01, 1997    Administered

 

                DTP             Unknown         1997    Administered

 

                engerix hepatitis b pediatric/adolescent (history) Unknown      

   Oct 13, 1997    

Administered

 

                acthib hib prp-t (history) Unknown         1996    Admin

istered

 

                acthib hib prp-t (history) Unknown         1997    Admin

istered

 

                acthib hib prp-t (history) Unknown         Dec 01, 1997    Admin

istered

 

                mmr-II (history) Unknown         Oct 13, 1997    Administered

 

                varivax (history) Unknown         Oct 13, 1997    Administered

 

                dt (history)    Unknown         1999   Administered

 

                influenza LAIV nasal (history) Unknown         2005    A

dministered

 

                tenivac td (history) Unknown         2012    Administere

d

 

                polio ipv (history) Unknown         2000   Administered







SOCIAL HISTORY

Sex Assigned At Birth:



                          Social History Observation Description

 

                          Sex Assigned At Birth     Unknown



Alcohol Screen (Audit-C)



                    Question            Answer              Notes

 

                    Did you have a drink containing alcohol in the past year? Ye

s                  

 

                    Points              1                    

 

                    Interpretation      Negative             

 

                          How often did you have 6 or more drinks on one occasio

n in the past year? Never 

(0 points)                               

 

                                        How many drinks did you have on a typica

l day when you were drinking in the past

year?                     1 or 2 (0 points)          

 

                          How often did you have a drink containing alcohol in t

he past year? Monthly or 

less (1 point)                           



DAST-10 ( Edition)



                    Question            Answer              Notes

 

                    1. Have you used drugs other than those required for medical

 reasons? No                   

 

                    2. Do you abuse more than one drug at a time? No            

       

 

                    3. Are you always able to stop using drugs when you want to?

 No                   

 

                    4. Have you had "blackouts" or "flashbacks" as a result of d

rug use? No                   

 

                    5. Do you ever feel bad or guilty about your drug use? No   

                

 

                                        6. Does your spouse (or parents) ever co

mplain about your involvement with 

drugs?                    No                         

 

                    7. Have you neglected your family because of your use of debbi

gs? No                   

 

                    8. Have you engaged in illegal activities in order to obtain

 drugs? No                   

 

                                        9. Have you ever experienced withdrawal 

symptoms (felt sick) when you stopped 

taking drugs?             No                         

 

                                        10. Have you had medical problems as a r

esult of your drug use (e.g., memory 

loss, hepatitis, convulsions, bleeding etc.)? No                         

 

                    Results:            1                    

 

                    Interpretation of Score: Low level            



Sexual History



                    Question            Answer              Notes

 

                    Had sex in the past 12 months (vaginal, oral, or anal)? Yes 

                 

 

                    Last menstrual period 2019           

 

                    Have you ever had a Sexually transmitted disease? No        

           

 

                    with                Men only             

 

                    Use protection?     No                   



PHQ2



                    Question            Answer              Notes

 

                                        In the last 2 weeks, how often have you 

had little interest or pleasure in doing

things?                   Not at all                 

 

                                        In the last 2 weeks, how often have you 

been feeling down, depressed, or 

hopeless?                 Not at all                 

 

                    Total PHQ2 Score    0                    







REASON FOR REFERRAL

No Information



VITAL SIGNS

No information



MEDICATIONS





           Medication SIG (Take, Route, Frequency, Duration) Notes      Start Da

te End Date   

Status

 

                    Pantoprazole Sodium 40 MG 1 tablet Orally Once a day after 3

 months need to 

switch to 20mg dose 26 May, 2022                            Active

 

             Docusate Sodium 100 MG 1 capsule Orally Twice a day for 30 days    

            

                                        Active

 

                    Famotidine 20 MG    1 tablet Orally Twice a day for 30 days 

In addition to 

Protonix.           11 Oct, 2022                            Active

 

                Iron (Ferrous Sulfate) 325 (65 fe) mg 1 tablet Orally Once a day

 for 30 days                 

                                        Active

 

           Prenatal                                               Active

 

                          Albuterol Sulfate  (90 Base) MCG/ACT 1 puff as 

needed Inhalation every 4 

hrs for 30 days prn                                 Active

 

           Probiotic - as directed Orally                                  Activ

e







PROCEDURES

No Information



RESULTS

No Results



REASON FOR VISIT

Requests return call



MEDICAL (GENERAL) HISTORY





                    Type                Description         Date

 

                    Medical History     Anxiety and panic attacks, patient repor

glen  

 

                    Medical History     Depression, patient reported  

 

                    Medical History     Scoliosis            

 

                    Medical History     asthma - mild intermittent  

 

                    Surgical History    Tubes in ears        

 

                    Hospitalization History see surgeries        







Goals Section

No Information



Health Concerns

No Information



MEDICAL EQUIPMENT

No Information



MENTAL STATUS

No Information



FUNCTIONAL STATUS

No Information



ASSESSMENTS

No Information



PLAN OF TREATMENT

Next Appt



                                        Details

 

                                        Provider Name:CELINA REHMAN, 2023 03

:40:00 PM, [address] [place] [address], 

[id number], [place], [address], [phone]

 

                                        Provider Name:CELINA REHMAN, 2023 03

:40:00 PM, [address] [place] [address], 

[id number], [place], [address], [phone]

 

                                        Provider Name:CELINA REHMAN, 2023 03

:40:00 PM, [address] [place] [address], 

[id number], [place], [address], [phone]







Insurance Providers





             Payer Name   Payer Address Payer Phone  Insured Name Patient Relati

onship to 

Insured         Coverage Start Date Coverage End Date Subscriber Number Group Nu

mber

 

                [employer] [address] [facility] [id number] [phone]

10    

Fabiola Juarez Self - patient is the insured 2021                       

       

 

                DANA Aena [facility] [address]  [address] [address]  [place] [phone]

[phone]    

Fabiola Juarez Self - patient is the insured 2022                [id number]

[id number]   







MEDICATIONS ADMINISTERED





                Medication      Instructions    Date of Administration Dosage

 

                ROCEPHIN 250 MG (IM)                     1 mL

 

                Dexamethasone                       4 mg

## 2023-02-13 NOTE — XMS REPORT
Summarization of Episode Note

                             Created on: 2023



Fabiola Juarez MATT

External Reference #: [id number]

: 1995

Sex: Female



Demographics





                          Address                   [address] [place]  [address]

 

                          Home Phone                [phone]

 

                          Preferred Language        Unknown

 

                          Marital Status            unmarried

 

                          Lutheran Affiliation     Unknown

 

                          Race                      White

 

                          Ethnic Group              Not  or 





Author





                          Author                    [facility]

 

                          Address                   Unknown

 

                          Phone                     Unavailable







Support





                Name            Relationship    Address         Phone

 

                    Fabiola Juarez     GUAR                [address] [place]  [address] [phone]

 

                    Theodora Juarez     ECON                [address] [place]  [address] [phone]







Care Team Providers





                    Care Team Member Name Role                Phone

 

                    JOSEKALYN STOKES      Unavailable         [phone]







PROBLEMS





          Type      Condition ICD9-CM Code AHK94-SO Code Onset Dates Condition S

tatus W/U 

Status              Risk                SNOMED Code         Notes

 

       Problem Prenatal care in third trimester        Z34.93               conf

irmed        [id number]  

 

                Problem         Gastroesophageal reflux disease, esophagitis pre

sence not specified                 

K21.9                            confirmed             [id number]   

 

       Problem Thyroid cyst        E04.1                confirmed        [id number]

4  

 

       Problem Hypothyroidism, unspecified        E03.9                confirmed

        [id number]  

 

                          Problem                   Endocrine, nutritional and m

etabolic diseases complicating pregnancy, 

unspecified trimester         O99.280                 confirmed         [id number]

2  

 

       Problem Anemia during pregnancy in third trimester        O99.013        

       confirmed                

 

       Problem Hyperthyroidism        E05.90               confirmed        3448

6009  

 

        Problem Seasonal allergic rhinitis, unspecified trigger         J30.2   

                confirmed         

[id number]                                

 

          Problem   Gastroesophageal reflux disease without esophagitis         

  K21.9                         

confirmed                               [id number]            

 

             Problem      Thyrotoxicosis, unspecified without thyrotoxic crisis 

or storm              E05.90       

                          confirmed                 [id number]      







ALLERGIES





                    Allergen (clinical drug ingredient) Drug/Non Drug Allergy do

cumented on EMR 

Reaction            Allergy Type        Onset Date          Status

 

           azithromycin Azithromycin(NDC Code:[address]-03) hives      Drug All

ergy            Active







ENCOUNTERS from 1996 to 2023





             Encounter    Location     Date         Provider     Diagnosis

 

                          [facility] [address]

[id number] 

[address]                  KALYN GARCIA        Skin infection L08.9

 and Screening for STD 

(sexually transmitted disease) Z11.3







IMMUNIZATIONS





                Vaccine         Route           Administration Date Status

 

                acthib hib prp-t (history) Unknown         1996    Admin

istered

 

                acthib hib prp-t (history) Unknown         1997    Admin

istered

 

                acthib hib prp-t (history) Unknown         Dec 01, 1997    Admin

istered

 

                influenza LAIV nasal (history) Unknown         2005    A

dministered

 

                varivax (history) Unknown         Oct 13, 1997    Administered

 

                dt (history)    Unknown         1999   Administered

 

                tenivac td (history) Unknown         2012    Administere

d

 

                engerix hepatitis b pediatric/adolescent (history) Unknown      

   Oct 13, 1997    

Administered

 

                DTP             Unknown         1996    Administered

 

                OPV             Unknown         1997    Administered

 

                OPV             Unknown         1996    Administered

 

                Influenza (split), preservative free, 6-35 months Unknown       

  2007    

Administered

 

                Influenza (split), preservative free, 6-35 months Unknown       

  Oct 15, 2004    

Administered

 

                Influenza (split), preservative free, 6-35 months Unknown       

  2004    

Administered

 

                Influenza (split), preservative free, 6-35 months Unknown       

  Dec 05, 2003    

Administered

 

                DTP             Unknown         Dec 01, 1997    Administered

 

                DTP             Unknown         1997    Administered

 

                PRIVATE TDAP (BOOSTRIX) IM Intramuscular 2022    Adminis

tered

 

                PRIVATE GARDASIL 9 (HPV) IM Intramuscular 2019    Admini

stered

 

                PRIVATE GARDASIL 9 (HPV) IM Intramuscular 2019  Admini

stered

 

                mmr-II (history) Unknown         Oct 13, 1997    Administered

 

                OPV             Unknown         Dec 01, 1997    Administered

 

                hepatitis b pediatric (history) Unknown         1996   

Administered

 

                hepatitis b pediatric (history) Unknown         1996    

Administered

 

                mmr-II (history) Unknown         2000   Administered

 

                polio ipv (history) Unknown         2000   Administered

 

                STATE FUNDED GARDASIL 9 (HPV) IM Intramuscular Oct 25, 2019    A

dministered







SOCIAL HISTORY

Sex Assigned At Birth:



                          Social History Observation Description

 

                          Sex Assigned At Birth     Unknown



Alcohol Screen (Audit-C)



                    Question            Answer              Notes

 

                    Did you have a drink containing alcohol in the past year? Ye

s                  

 

                    Points              1                    

 

                    Interpretation      Negative             

 

                          How often did you have 6 or more drinks on one occasio

n in the past year? Never 

(0 points)                               

 

                                        How many drinks did you have on a typica

l day when you were drinking in the past

year?                     1 or 2 (0 points)          

 

                          How often did you have a drink containing alcohol in t

he past year? Monthly or 

less (1 point)                           



DAST-10 ( Edition)



                    Question            Answer              Notes

 

                    1. Have you used drugs other than those required for medical

 reasons? No                   

 

                    2. Do you abuse more than one drug at a time? No            

       

 

                    3. Are you always able to stop using drugs when you want to?

 No                   

 

                    4. Have you had "blackouts" or "flashbacks" as a result of d

rug use? No                   

 

                    5. Do you ever feel bad or guilty about your drug use? No   

                

 

                                        6. Does your spouse (or parents) ever co

mplain about your involvement with 

drugs?                    No                         

 

                    7. Have you neglected your family because of your use of debbi

gs? No                   

 

                    8. Have you engaged in illegal activities in order to obtain

 drugs? No                   

 

                                        9. Have you ever experienced withdrawal 

symptoms (felt sick) when you stopped 

taking drugs?             No                         

 

                                        10. Have you had medical problems as a r

esult of your drug use (e.g., memory 

loss, hepatitis, convulsions, bleeding etc.)? No                         

 

                    Results:            1                    

 

                    Interpretation of Score: Low level            



Sexual History



                    Question            Answer              Notes

 

                    Had sex in the past 12 months (vaginal, oral, or anal)? Yes 

                 

 

                    Last menstrual period 2019           

 

                    Have you ever had a Sexually transmitted disease? No        

           

 

                    with                Men only             

 

                    Use protection?     No                   



PHQ2



                    Question            Answer              Notes

 

                                        In the last 2 weeks, how often have you 

had little interest or pleasure in doing

things?                   Not at all                 

 

                                        In the last 2 weeks, how often have you 

been feeling down, depressed, or 

hopeless?                 Not at all                 

 

                    Total PHQ2 Score    0                    







REASON FOR REFERRAL

No Information



VITAL SIGNS





                    Height              65 in               

 

                    Height-cm           165.1 cm            

 

                    Weight              148 lbs             

 

                    Weight-kg           67.13 kg            

 

                    Temperature         99.1 degrees Fahrenheit 

 

                    Heart Rate          repeat:90 bpm       

 

                    Respiratory Rate    20 bpm              

 

                    Oximetry            98 %                

 

                    BMI                 24.63 kg/m2         

 

                    Blood pressure systolic 120 mmHg            

 

                    Blood pressure diastolic 76 mmHg             







MEDICATIONS





           Medication SIG (Take, Route, Frequency, Duration) Notes      Start Da

te End Date   

Status

 

                    Pantoprazole Sodium 40 MG 1 tablet Orally Once a day after 3

 months need to 

switch to 20mg dose 26 May, 2022                            Active

 

             Docusate Sodium 100 MG 1 capsule Orally Twice a day for 30 days    

            

                                        Active

 

                          Albuterol Sulfate  (90 Base) MCG/ACT 1 puff as 

needed Inhalation every 4 

hrs for 30 days prn                                 Active

 

           Prenatal                                               Active

 

                Iron (Ferrous Sulfate) 325 (65 fe) mg 1 tablet Orally Once a day

 for 30 days                 

                                        Active

 

           Probiotic - as directed Orally                                  Activ

e

 

                    Famotidine 20 MG    1 tablet Orally Twice a day for 30 days 

In addition to 

Protonix.           11 Oct, 2022                            Active







PROCEDURES

No Information



RESULTS

No Results



REASON FOR VISIT

growth in genital area, pt states its painful, pt states it started yesterday an
d over night gotten bigger not itching at this time, pt also wants to do another
chalymdia  test, Madelyn Ahuja



MEDICAL (GENERAL) HISTORY





                    Type                Description         Date

 

                    Medical History     Anxiety and panic attacks, patient repor

geln  

 

                    Medical History     Depression, patient reported  

 

                    Medical History     Scoliosis            

 

                    Medical History     asthma - mild intermittent  

 

                    Surgical History    Tubes in ears        

 

                    Hospitalization History see surgeries        







Goals Section

No Information



Health Concerns

No Information



MEDICAL EQUIPMENT

No Information



MENTAL STATUS

No Information



FUNCTIONAL STATUS

No Information



ASSESSMENTS





             Encounter Date Diagnosis    Assessment Notes Treatment Notes Treatm

ent Clinical 

Notes

 

                    Skin infection (ICD-10 - L08.9)                 

Symptomatic care discussed. 

Oral antibiotic as ordered. F/u if not improving.



 

                            Screening for STD (sexually transmitted 

disease) (ICD-10 - Z11.3)  

                                        Pt wanting this rechecked- wanting sent 

to [facility] not state. Will treat if 

needed.



 

                    Other                           sulfamethoxazole

 and trimethoprim (oral/injection) 

[Medication Leaflet] material was published  







PLAN OF TREATMENT

Treatment Notes



                    Assessment          Notes               Clinical Notes

 

                          Skin infection            Symptomatic care discussed. 

Oral antibiotic as ordered. F/u if 

not improving.



 

                          Screening for STD (sexually transmitted disease)   Pt 

wanting this rechecked- 

wanting sent to [facility] not state. Will treat if needed.





Next Appt



                                        Details

 

                                        prn Reason:if symptoms worsen or fail to

 improve

 

                                        Provider Name:CELINA REHMAN, 2023 03

:40:00 PM, [address] [place] [address], 

[id number], [place], [address], [phone]

 

                                        Provider Name:CELINA REHMAN, 2023 03

:40:00 PM, [address] [facility], 

[id number], [place], [address], [phone]



Follow Up:prnif symptoms worsen or fail to improve



Insurance Providers





             Payer Name   Payer Address Payer Phone  Insured Name Patient Relati

onship to 

Insured         Coverage Start Date Coverage End Date Subscriber Number Group Nu

mber

 

                [employer] [facility] [address]  [address] [address]  [place] [phone]

[phone]    

Fabiola Juarez Self - patient is the insured 2022                [id number]

[id number]   

 

                [employer] [address] [facility] [address] [phone]

[phone]    

Fabiola Juarez Self - patient is the insured 2021                       

       







MEDICATIONS ADMINISTERED





                Medication      Instructions    Date of Administration Dosage

 

                Dexamethasone                       4 mg

 

                ROCEPHIN 250 MG (IM)                     1 mL

## 2023-02-13 NOTE — XMS REPORT
Encounter Summary

                             Created on: 2023



Fabiola Juarez

External Reference #: [id number]

: 1995

Sex: Female



Demographics





                          Address                   [address] [place]  [address]

 

                          Home Phone                [phone]

 

                          Preferred Language        English

 

                          Marital Status            Single

 

                          Faith Affiliation     Unknown

 

                          Race                      White

 

                          Ethnic Group              Not  or 





Author





                          Author                    [facility]

 

                          Organization              [facility]

 

                          Address                   Unknown

 

                          Phone                     Unavailable







Support





                Name            Relationship    Address         Phone

 

                Zachary Patterson      ECON            Unknown         [phone]







Care Team Providers





                    Care Team Member Name Role                Phone

 

                          PCP                       Unavailable







Encounter Details





                          Care Team                 Description



                     Date                Type                Department  

 

                                        



Mariah Buitrago MD



[address] [place] [address] [id number] [phone] (Work)



[phone] (Fax)                       



                     2022          Documentation       [facility]  



                                         [facility]  



                                         [facility]  



                                         [address] [place] [address] [id number] [phone]  







Social History





                                        Date



                 Tobacco Use     Types           Packs/Day       Years Used 

 

                                         



                                         Smoking Tobacco: Never    

 

                                         



                                         Smokeless Tobacco: Never    







                                        Comments



                           Alcohol Use               Standard Drinks/Week 

 

                                         



                           Never                     0 (1 standard drink = 0.6 o

z pure alcohol) 







 



                           Sex Assigned at Birth     Date Recorded

 

 



                                         Not on file 



documented as of this encounter



Plan of Treatment





Not on filedocumented as of this encounter



Procedures





                                        Comments



                 Procedure Name  Priority        Date/Time       Associated Diag

nosis 

 

                                         



                     LAB OUTSIDE RECORD  Routine             2022  

 

                                        



Results for this procedure are in the results section.



                     T3 FREE             Routine             2022  



documented in this encounter



Results

* Lab Outside Record (2022)



                Anatomical Location / Laterality Collection Method / Volume Jen

ection Time 

Received Time



                                         Specimen (Source)    

 

                                                2022       



                                         Blood    







                                        Narrative

 

                                        



This result has an attachment that is not available.











 



                           Authorizing Provider      Result Type

 

 



                           Historical Provider       LAB BLOOD ORDERABLES



                                         MD 





* T3 Free (2022)



                                        Pathologist Signature



            Component  Value      Ref        Test Method  Analysis   Performed A

t 



                           Range                     Time  

 

                                         



                           T3 Free                   2.9     







                Anatomical Location / Laterality Collection Method / Volume Jen

ection Time 

Received Time



                                         Specimen (Source)    

 

                                                                 



                                         Blood (Venous)    







 



                           Authorizing Provider      Result Type

 

 



                           Mariah Buitrago MD        LAB BLOOD ORDERABLES





documented in this encounter



Visit Diagnoses

Not on filedocumented in this encounter

## 2023-02-13 NOTE — XMS REPORT
Clinical Summary

                             Created on: 2023



Fabiola Juarez

External Reference #: [id number]

: 1995

Sex: Female



Demographics





                          Address                   [address] [place]  [address]

 

                          Home Phone                [phone]

 

                          Preferred Language        English

 

                          Marital Status            Single

 

                          Presybeterian Affiliation     Unknown

 

                          Race                      White

 

                          Ethnic Group              Not  or 





Author





                          Author                    [facility]

 

                          Organization              [facility]

 

                          Address                   Unknown

 

                          Phone                     Unavailable







Support





                Name            Relationship    Address         Phone

 

                Zachary Patterson      [employer]            Unknown         [phone]







Care Team Providers





                    Care Team Member Name Role                Phone

 

                          PCP                       Unavailable







Allergies





                                        Comments



                 Active Allergy  Reactions       Severity        Noted Date 

 

                                        



Other reaction(s): hives



                           Azithromycin              2022 







Medications





                          End Date                  Status



              Medication   Sig          Dispensed    Refills      Start  



                                         Date  

 

                                                    Active



                 albuterol       1 puff as       0                 



                     (PROAIR/PROVENTIL/VENTOLI  needed              1  



                                         N) 90 mcg/actuation HFA      



                                         inhaler      

 

                                                    Active



                 pantoprazole (PROTONIX)  Take 1 tablet   0                 



                     40 MG tablet        by mouth.           2  

 

                                                    Active



                     prenatal 25/iron    Take by             0   



                           fum/folic/dha (PRENATAL-1  mouth.     



                                         ORAL)      

 

                                                    Active



                     UNABLE TO FIND      Probiotic           0   







Active Problems





 



                           Problem                   Noted Date

 

 



                           Thyrotoxicosis during pregnancy  2022







Encounters





                          Care Team                 Description



                     Date                Type                Specialty  

 

                                        



Meagan Montejo RN                Thyrotoxicosis during pregnancy (Primary

 Dx)



                     2022          Orders Only         Endocrinology  

 

                                        



Mariah Buitrago MD                     



                     2022          Documentation       Endocrinology  

 

                                        



Mariah Buitrago MD                     



                     2022          Documentation       Endocrinology  

 

                                        



Mariah Buitrago MD                     



                     2022          Documentation       Endocrinology  



from Last 3 Months



Family History





   



                 Medical History  Relation        Name            Comments

 

   



                           Thyroid disease           Maternal  



                                         Grandfather  







   



                 Relation        Name            Status          Comments

 

   



                           Father                    Alive 

 

   



                                         Maternal Grandfather   

 

   



                           Mother                    Alive 







Social History





                                        Date



                 Tobacco Use     Types           Packs/Day       Years Used 

 

                                         



                                         Smoking Tobacco: Never    

 

                                         



                                         Smokeless Tobacco: Never    







                                        Comments



                           Alcohol Use               Standard Drinks/Week 

 

                                         



                           Never                     0 (1 standard drink = 0.6 o

z pure alcohol) 







 



                           Sex Assigned at Birth     Date Recorded

 

 



                                         Not on file 







Last Filed Vital Signs





                    Reading             Time Taken          Comments



                                         Vital Sign   

 

                    118/62              2022 10:55 AM CDT  



                                         Blood Pressure   

 

                    98                  2022 10:55 AM CDT  



                                         Pulse   

 

                    -                   -                    



                                         Temperature   

 

                    -                   -                    



                                         Respiratory Rate   

 

                    98%                 2022 10:55 AM CDT  



                                         Oxygen Saturation   

 

                    -                   -                    



                                         Inhaled Oxygen   



                                         Concentration   

 

                    80.5 kg (177 lb 6.4 oz) 2022 10:55 AM CDT pregancy maximino

ght



                                         Weight   

 

                    162.6 cm (5' 4")    2022 10:55 AM CDT  



                                         Height   

 

                    30.45               2022 10:55 AM CDT  



                                         Body Mass Index   







Plan of Treatment





   



                 Health Maintenance  Due Date        Last Done       Comments

 

   



                           Hepatitis C Screen        1995  

 

   



                           Td/Tdap#                  1996  

 

   



                           COVID-19 Vaccine (#1)     1997  

 

   



                           Cervical Cancer Screening  2017  



                                         via Pap Smear   

 

   



                     Influenza Vaccine (#1)  10/01/2022          2007, 



                                         2005, 



                                         10/15/2004, 



                                         Additional 



                                         history 



                                         exists 

 

   



                           Social Determinants of    2023  



                                         Health#   

 

   



                     HPV Vaccine         Completed           10/25/2019, 



                                         2019, 



                                         2019 

 

   



                     Pneumococcal Vaccine:  Aged Out            No longer eligib

le based on patient's age to



                           Pediatrics (0 to 5 Years)    complete this topic



                                         and At-Risk Patients (6   



                                         to 64 Years)   







Procedures





                                        Comments



                 Procedure Name  Priority        Date/Time       Associated Diag

nosis 

 

                                        



Results for this procedure are in the results section.



                     LAB OUTSIDE RECORD  Routine             2022  

 

                                        



Results for this procedure are in the results section.



                     THYROID STIMULATING  Routine             2022  



                                         HORMONE    

 

                                        



Results for this procedure are in the results section.



                     T4 FREE             Routine             2022  

 

                                         



                     LAB OUTSIDE RECORD  Routine             2022  

 

                                        



Results for this procedure are in the results section.



                     T3 FREE             Routine             2022  



from Last 3 Months



Results

* Lab Outside Record (2022)



Only the most recent of 2 results within the time period is included.





                Anatomical Location / Laterality Collection Method / Volume Jen

ection Time 

Received Time



                                         Specimen (Source)    

 

                                                2022       



                                         Blood    







                                        Narrative

 

                                        



This result has an attachment that is not available.











 



                           Authorizing Provider      Result Type

 

 



                           Historical Provider       LAB BLOOD ORDERABLES



                                         MD 





* Thyroid Stimulating Hormone (2022)



                                        Pathologist Signature



            Component  Value      Ref        Test Method  Analysis   Performed A

t 



                           Range                     Time  

 

                                         



                           TSH                       0.13     







                Anatomical Location / Laterality Collection Method / Volume Jen

ection Time 

Received Time



                                         Specimen (Source)    

 

                                                                 



                                         Blood (Venous)    







 



                           Authorizing Provider      Result Type

 

 



                           Mariah Buitrago MD        LAB BLOOD ORDERABLES





* T4 Free (2022)



                                        Pathologist Signature



            Component  Value      Ref        Test Method  Analysis   Performed A

t 



                           Range                     Time  

 

                                         



                           T4 Free                   1.3     







                Anatomical Location / Laterality Collection Method / Volume Jen

ection Time 

Received Time



                                         Specimen (Source)    

 

                                                                 



                                         Blood (Venous)    







 



                           Authorizing Provider      Result Type

 

 



                           Mariah Buitrago MD        LAB BLOOD ORDERABLES





* T3 Free (2022)



                                        Pathologist Signature



            Component  Value      Ref        Test Method  Analysis   Performed A

t 



                           Range                     Time  

 

                                         



                           T3 Free                   2.9     







                Anatomical Location / Laterality Collection Method / Volume Jen

ection Time 

Received Time



                                         Specimen (Source)    

 

                                                                 



                                         Blood (Venous)    







 



                           Authorizing Provider      Result Type

 

 



                           Mariah Buitrago MD        LAB BLOOD ORDERABLES





from Last 3 Months



Insurance





                                        Type



            Payer      Benefit    Subscriber ID  Effective  Phone      Address 



                           Plan /                    Dates   



                                         Group     

 

                                         



            [employer]ICA[employer]NAG[employer]  [employer]      [id number]  2022-P  [phone]

[address] 



                 ([place])            [place]          resent          [id number] 



                           [facility] 



                                         [id number] 







     



            Guarantor Name  Account    Relation to  Date of    Phone      Billin

g Address



                     Type                Patient             Birth  

 

     



              Fabiola Juarez  Personal/F   Self         1995   [address]

[address] [place]                     [place] [address]

 

     



              Fabiola Juarez  Personal/F   Self         1995   [address]



                           [place] [address]

## 2023-02-13 NOTE — XMS REPORT
Encounter Summary

                             Created on: 2023



Fabiola Juarez

External Reference #: [id number]

: 1995

Sex: Female



Demographics





                          Address                   [address] [place]  [address]

 

                          Home Phone                [phone]

 

                          Preferred Language        English

 

                          Marital Status            Single

 

                          Congregational Affiliation     Unknown

 

                          Race                      White

 

                          Ethnic Group              Not  or 





Author





                          Author                    [facility]

 

                          Organization              [facility]

 

                          Address                   Unknown

 

                          Phone                     Unavailable







Support





                Name            Relationship    Address         Phone

 

                Zachary Patterson      [employer]            Unknown         [phone]







Care Team Providers





                    Care Team Member Name Role                Phone

 

                          PCP                       Unavailable







Encounter Details





                          Care Team                 Description



                     Date                Type                Department  

 

                                        



Meagan Montejo RN                Thyrotoxicosis during pregnancy (Primary

 Dx)



                     2022          Orders Only         [facility]  



                                         [facility]  



                                         [facility]  



                                         [address] [place] [address] [phone]  







Social History





                                        Date



                 Tobacco Use     Types           Packs/Day       Years Used 

 

                                         



                                         Smoking Tobacco: Never    

 

                                         



                                         Smokeless Tobacco: Never    







                                        Comments



                           Alcohol Use               Standard Drinks/Week 

 

                                         



                           Never                     0 (1 standard drink = 0.6 o

z pure alcohol) 







 



                           Sex Assigned at Birth     Date Recorded

 

 



                                         Not on file 



documented as of this encounter



Plan of Treatment





                                        Order Schedule



                 Name            Type            Priority        Associated Diag

noses 

 

                                        Expected: 2023 (Approximate), Expi

res: 2023



                 T3 Free         Lab             Routine         Thyrotoxicosis 

during 



                                         pregnancy 

 

                                        Expected: 2023 (Approximate), Expi

res: 2023



                 T4 Free         Lab             Routine         Thyrotoxicosis 

during 



                                         pregnancy 

 

                                        Expected: 2023 (Approximate), Expi

res: 2023



                 Thyroid Stimulating  Lab             Routine         Thyrotoxic

osis during 



                           Hormone                   pregnancy 



documented as of this encounter



Visit Diagnoses









                                         Diagnosis

 





                                         Thyrotoxicosis during pregnancy - Prima

ry



documented in this encounter

## 2023-02-13 NOTE — XMS REPORT
Encounter Summary

                             Created on: 2023



Fabiola Juarez

External Reference #: [id number]

: 1995

Sex: Female



Demographics





                          Address                   [address] [place]  [address]

 

                          Home Phone                [phone]

 

                          Preferred Language        English

 

                          Marital Status            Single

 

                          Congregation Affiliation     Unknown

 

                          Race                      White

 

                          Ethnic Group              Not  or 





Author





                          Author                    [facility]

 

                          Organization              [facility]

 

                          Address                   Unknown

 

                          Phone                     Unavailable







Support





                Name            Relationship    Address         Phone

 

                Zachary Patterson      [employer]            Unknown         [phone]







Care Team Providers





                    Care Team Member Name Role                Phone

 

                          PCP                       Unavailable







Encounter Details





                          Care Team                 Description



                     Date                Type                Department  

 

                                        



Mariah Buitrago MD



[address] [place] [address] [phone] (Work)



[phone] (Fax)                       



                     2022          Documentation       [facility]  



                                         [facility]  



                                         [facility]  



                                         [address] [place] [id number] [phone]  







Social History





                                        Date



                 Tobacco Use     Types           Packs/Day       Years Used 

 

                                         



                                         Smoking Tobacco: Never    

 

                                         



                                         Smokeless Tobacco: Never    







                                        Comments



                           Alcohol Use               Standard Drinks/Week 

 

                                         



                           Never                     0 (1 standard drink = 0.6 o

z pure alcohol) 







 



                           Sex Assigned at Birth     Date Recorded

 

 



                                         Not on file 



documented as of this encounter



Progress Notes

* Mariah Buitrago MD - 2022  3:43 PM CSTFormatting of this note might be 
  different from the original.

Meagan, 

Please notify pt:

TSH 0.13, fT4 1.3, fT3 2.9. 

Subclinical hyperthyroidism. fT3 and fT4 still normal.

No need for tx

Check TSH, fT4, fT3 in 4 weeks. Please mail lab slip to pt to do closed to home

F/up 6 weeks after delivery, as planned



Thank you

RIVER Buitrago MD

Electronically signed by Mariah Buitrago MD at 2022  5:55 PM CST



documented in this encounter



Plan of Treatment





Not on filedocumented as of this encounter



Procedures





                                        Comments



                 Procedure Name  Priority        Date/Time       Associated Diag

nosis 

 

                                        



Results for this procedure are in the results section.



                     LAB OUTSIDE RECORD  Routine             2022  

 

                                        



Results for this procedure are in the results section.



                     THYROID STIMULATING  Routine             2022  



                                         HORMONE    

 

                                        



Results for this procedure are in the results section.



                     T4 FREE             Routine             2022  



documented in this encounter



Results

* Lab Outside Record (2022)



                Anatomical Location / Laterality Collection Method / Volume Jen

ection Time 

Received Time



                                         Specimen (Source)    

 

                                                2022       



                                         Blood    







                                        Narrative

 

                                        



This result has an attachment that is not available.











 



                           Authorizing Provider      Result Type

 

 



                           Historical Provider       LAB BLOOD ORDERABLES



                                         MD 





* Thyroid Stimulating Hormone (2022)



                                        Pathologist Signature



            Component  Value      Ref        Test Method  Analysis   Performed A

t 



                           Range                     Time  

 

                                         



                           TSH                       0.13     







                Anatomical Location / Laterality Collection Method / Volume Jen

ection Time 

Received Time



                                         Specimen (Source)    

 

                                                                 



                                         Blood (Venous)    







 



                           Authorizing Provider      Result Type

 

 



                           Mariah Buitrago MD        LAB BLOOD ORDERABLES





* T4 Free (2022)



                                        Pathologist Signature



            Component  Value      Ref        Test Method  Analysis   Performed A

t 



                           Range                     Time  

 

                                         



                           T4 Free                   1.3     







                Anatomical Location / Laterality Collection Method / Volume Jen

ection Time 

Received Time



                                         Specimen (Source)    

 

                                                                 



                                         Blood (Venous)    







 



                           Authorizing Provider      Result Type

 

 



                           Mariah Buitrago MD        LAB BLOOD ORDERABLES





documented in this encounter



Visit Diagnoses

Not on filedocumented in this encounter

## 2023-02-13 NOTE — XMS REPORT
Encounter Summary

                             Created on: 2023



Fabiola Juarez

External Reference #: [id number]

: 1995

Sex: Female



Demographics





                          Address                   [address] [place]  [address]

 

                          Home Phone                [phone]

 

                          Preferred Language        English

 

                          Marital Status            Single

 

                          Restorationist Affiliation     Unknown

 

                          Race                      White

 

                          Ethnic Group              Not  or 





Author





                          Author                    [facility]

 

                          Organization              [facility]

 

                          Address                   Unknown

 

                          Phone                     Unavailable







Support





                Name            Relationship    Address         Phone

 

                Zachary SKINNER            Unknown         [phone]







Care Team Providers





                    Care Team Member Name Role                Phone

 

                          PCP                       Unavailable







Encounter Details





                          Care Team                 Description



                     Date                Type                Department  

 

                                        



Mariah Buitrago MD



[address] [place] [address] [phone] (Work)



[phone] (Fax)                       



                     2022          Documentation       [facility]  



                                         [facility]  



                                         [facility]  



                                         [address] [place] [id number] [phone]  







Social History





                                        Date



                 Tobacco Use     Types           Packs/Day       Years Used 

 

                                         



                                         Smoking Tobacco: Never    

 

                                         



                                         Smokeless Tobacco: Never    







                                        Comments



                           Alcohol Use               Standard Drinks/Week 

 

                                         



                           Never                     0 (1 standard drink = 0.6 o

z pure alcohol) 







 



                           Sex Assigned at Birth     Date Recorded

 

 



                                         Not on file 



documented as of this encounter



Progress Notes

* Mariah Buitrago MD - 2022 11:14 AM CSTFormatting of this note might be 
  different from the original.

Meagan

Please, notify patient

Outside labs 2022

Glucose 92, creatinine 0.45, sodium 138, potassium 3.8, calcium 6.2

Very low calcium?  Laboratory error?

Recommend follow-up with PCP for repeated labs.

Alkaline phosphatase 127, elevated, remainder of liver function test normal.

Hemoglobin 10.7, hematocrit 32.5, WBC 10.0,



I ordered thyroid function tests.

There are no thyroid function tests on the labs that were faxed

Please, ask patient if she had them done.  If not, she should have a lab slip



Thank you

Mariah Buitrago MD





Electronically signed by Mariah Buitrago MD at 2022 11:24 AM CST



* Meagan Montejo RN - 2022 11:14 AM CSTFormatting of this note might
  be different from the original.

Notified of lab results. Reviewed recommendation to follow up with PCP for repea
glen labs due to low calcium. Verbalized understanding.

Spoke with Theodora at lab. TFT were drawn. Will fax results. Confirmed correct fa
x number.

Electronically signed by Meagan Montejo RN at 2022 11:59 AM CST



documented in this encounter



Plan of Treatment





Not on filedocumented as of this encounter



Visit Diagnoses

Not on filedocumented in this encounter

## 2023-02-13 NOTE — XMS REPORT
Summarization of Episode Note

                             Created on: 2022



Fabiola Juarez MATT

External Reference #: [id number]

: 1995

Sex: Female



Demographics





                          Address                   [address] [place]  [address]

 

                          Home Phone                [phone]

 

                          Preferred Language        Unknown

 

                          Marital Status            unmarried

 

                          Presybeterian Affiliation     Unknown

 

                          Race                      White

 

                          Ethnic Group              Not  or 





Author





                          Author                    [facility]

 

                          Address                   Unknown

 

                          Phone                     Unavailable







Support





                Name            Relationship    Address         Phone

 

                    Fabiola Juarez     GUAR                [address] [place]  [address] [phone]

 

                    Theodora Juarez     ECON                [address] [place]  [address] [phone]







Care Team Providers





                    Care Team Member Name Role                Phone

 

                    KALYN GARCIA      Unavailable         [phone]







PROBLEMS





          Type      Condition ICD9-CM Code XDU22-LF Code Onset Dates Condition S

tatus W/U 

Status              Risk                SNOMED Code         Notes

 

       Problem Prenatal care in first trimester        Z34.91               conf

irmed                

 

                Problem         Gastroesophageal reflux disease, esophagitis pre

sence not specified                 

K21.9                            confirmed             [id number]   

 

       Problem Thyroid cyst        E04.1                confirmed        [id number]

4  

 

       Problem Hypothyroidism, unspecified        E03.9                confirmed

        [id number]  

 

       Problem Prenatal care in third trimester        Z34.93               conf

irmed        [id number]  

 

       Problem Anemia during pregnancy in third trimester        O99.013        

       confirmed                

 

                          Problem                   Endocrine, nutritional and m

etabolic diseases complicating pregnancy, 

unspecified trimester         O99.280                 confirmed         [id number]

2  

 

       Problem Hyperthyroidism        E05.90               confirmed        3448

6009  

 

        Problem Seasonal allergic rhinitis, unspecified trigger         J30.2   

                confirmed         

[id number]                                

 

          Problem   Gastroesophageal reflux disease without esophagitis         

  K21.9                         

confirmed                               [id number]            

 

             Problem      Thyrotoxicosis, unspecified without thyrotoxic crisis 

or storm              E05.90       

                          confirmed                 [id number]      







ALLERGIES





                    Allergen (clinical drug ingredient) Drug/Non Drug Allergy do

cumented on EMR 

Reaction            Allergy Type        Onset Date          Status

 

           azithromycin Azithromycin(NDC Code:[address]-83) hives      Drug All

ergy            Active







ENCOUNTERS from 1995 to 2022





             Encounter    Location     Date         Provider     Diagnosis

 

                          [facility] [address]

[id number] 

[address]                  KALYN JOSE        Hyperthyroidism E05.

90 and Constipation, 

unspecified constipation type K59.00







IMMUNIZATIONS





                Vaccine         Route           Administration Date Status

 

                DTP             Unknown         Dec 01, 1997    Administered

 

                DTP             Unknown         1997    Administered

 

                DTP             Unknown         1996    Administered

 

                PRIVATE GARDASIL 9 (HPV) IM Intramuscular 2019    Admini

stered

 

                Influenza (split), preservative free, 6-35 months Unknown       

  2007    

Administered

 

                Influenza (split), preservative free, 6-35 months Unknown       

  Oct 15, 2004    

Administered

 

                Influenza (split), preservative free, 6-35 months Unknown       

  2004    

Administered

 

                Influenza (split), preservative free, 6-35 months Unknown       

  Dec 05, 2003    

Administered

 

                PRIVATE TDAP (BOOSTRIX) IM Intramuscular 2022    Adminis

luanne

 

                tenivac td (history) Unknown         2012    Administere

d

 

                engerix hepatitis b pediatric/adolescent (history) Unknown      

   Oct 13, 1997    

Administered

 

                acthib hib prp-t (history) Unknown         1996    Admin

istered

 

                acthib hib prp-t (history) Unknown         1997    Admin

istered

 

                acthib hib prp-t (history) Unknown         Dec 01, 1997    Admin

istered

 

                influenza LAIV nasal (history) Unknown         2005    A

dministered

 

                STATE FUNDED GARDASIL 9 (HPV) IM Intramuscular Oct 25, 2019    A

dministered

 

                PRIVATE GARDASIL 9 (HPV) IM Intramuscular 2019  Admini

roxy

 

                mmr-II (history) Unknown         2000   Administered

 

                mmr-II (history) Unknown         Oct 13, 1997    Administered

 

                hepatitis b pediatric (history) Unknown         1996    

Administered

 

                hepatitis b pediatric (history) Unknown         1996   

Administered

 

                dt (history)    Unknown         1999   Administered

 

                varivax (history) Unknown         Oct 13, 1997    Administered

 

                polio ipv (history) Unknown         2000   Administered

 

                OPV             Unknown         Dec 01, 1997    Administered

 

                OPV             Unknown         1997    Administered

 

                OPV             Unknown         1996    Administered







SOCIAL HISTORY

Sex Assigned At Birth:



                          Social History Observation Description

 

                          Sex Assigned At Birth     Unknown



Alcohol Screen (Audit-C)



                    Question            Answer              Notes

 

                    Did you have a drink containing alcohol in the past year? Ye

s                  

 

                    Points              1                    

 

                    Interpretation      Negative             

 

                          How often did you have 6 or more drinks on one occasio

n in the past year? Never 

(0 points)                               

 

                                        How many drinks did you have on a typica

l day when you were drinking in the past

year?                     1 or 2 (0 points)          

 

                          How often did you have a drink containing alcohol in t

he past year? Monthly or 

less (1 point)                           



DAST-10 ( Edition)



                    Question            Answer              Notes

 

                    1. Have you used drugs other than those required for medical

 reasons? No                   

 

                    2. Do you abuse more than one drug at a time? No            

       

 

                    3. Are you always able to stop using drugs when you want to?

 No                   

 

                    4. Have you had "blackouts" or "flashbacks" as a result of d

rug use? No                   

 

                    5. Do you ever feel bad or guilty about your drug use? No   

                

 

                                        6. Does your spouse (or parents) ever co

mplain about your involvement with 

drugs?                    No                         

 

                    7. Have you neglected your family because of your use of debbi

gs? No                   

 

                    8. Have you engaged in illegal activities in order to obtain

 drugs? No                   

 

                                        9. Have you ever experienced withdrawal 

symptoms (felt sick) when you stopped 

taking drugs?             No                         

 

                                        10. Have you had medical problems as a r

esult of your drug use (e.g., memory 

loss, hepatitis, convulsions, bleeding etc.)? No                         

 

                    Results:            1                    

 

                    Interpretation of Score: Low level            



Sexual History



                    Question            Answer              Notes

 

                    Had sex in the past 12 months (vaginal, oral, or anal)? Yes 

                 

 

                    Last menstrual period 2019           

 

                    Have you ever had a Sexually transmitted disease? No        

           

 

                    with                Men only             

 

                    Use protection?     No                   



PHQ2



                    Question            Answer              Notes

 

                                        In the last 2 weeks, how often have you 

had little interest or pleasure in doing

things?                   Not at all                 

 

                                        In the last 2 weeks, how often have you 

been feeling down, depressed, or 

hopeless?                 Not at all                 

 

                    Total PHQ2 Score    0                    







REASON FOR REFERRAL

No Information



VITAL SIGNS





                    Height              65 in               

 

                    Height-cm           165.1 cm            

 

                    Weight              150 lbs             

 

                    Weight-kg           68.04 kg            

 

                    Temperature         98.7 degrees Fahrenheit 

 

                    Heart Rate          90 bpm              

 

                    Respiratory Rate    20 bpm              

 

                    Oximetry            98 %                

 

                    BMI                 24.96 kg/m2         

 

                    Blood pressure systolic 110 mmHg            

 

                    Blood pressure diastolic 70 mmHg             







MEDICATIONS





           Medication SIG (Take, Route, Frequency, Duration) Notes      Start Da

te End Date   

Status

 

                          Albuterol Sulfate  (90 Base) MCG/ACT 1 puff as 

needed Inhalation every 4 

hrs for 30 days prn                                 Active

 

                    Famotidine 20 MG    1 tablet Orally Twice a day for 30 days 

In addition to 

Protonix.           11 Oct, 2022                            Active

 

           Prenatal                                               Active

 

             Docusate Sodium 100 MG 1 capsule Orally Twice a day for 30 days    

            

                                        Active

 

           Probiotic - as directed Orally                                  Activ

e

 

                    Pantoprazole Sodium 40 MG 1 tablet Orally Once a day after 3

 months need to 

switch to 20mg dose 26 May, 2022                            Active

 

                Iron (Ferrous Sulfate) 325 (65 fe) mg 1 tablet Orally Once a day

 for 30 days                 

                                        Active







PROCEDURES

No Information



RESULTS

No Results



REASON FOR VISIT

review thyroid labs, Madelyn Ho



MEDICAL (GENERAL) HISTORY





                    Type                Description         Date

 

                    Medical History     Anxiety and panic attacks, patient repor

glen  

 

                    Medical History     Depression, patient reported  

 

                    Medical History     Scoliosis            

 

                    Medical History     asthma - mild intermittent  

 

                    Surgical History    Tubes in ears        

 

                    Hospitalization History see surgeries        







Goals Section

No Information



Health Concerns

No Information



MEDICAL EQUIPMENT

No Information



MENTAL STATUS

No Information



FUNCTIONAL STATUS

No Information



ASSESSMENTS





             Encounter Date Diagnosis    Assessment Notes Treatment Notes Treatm

ent Clinical 

Notes

 

                    Hyperthyroidism (ICD-10 - E05.90)               

  Labs reviewed- pt has appt 

with endocrinologist May 2021. pt feeling ok- continue to monitor levels



 

                    Constipation, unspecified constipation type (ICD

-10 - K59.00)                 

Constipation: Care Instructions material was published

                                        Discussed trying to reset bowels since r

ecent antibiotic use- Miralax 3 capfuls

x 3 days then 1 capful daily until having regular soft BM daily then can wean 
down to keep at 1 soft BM daily Continue probiotic and increasing water intake 
and dietary fiber. Should symptoms worsen or fail to improve notify clinic.









PLAN OF TREATMENT

Treatment Notes



                    Assessment          Notes               Clinical Notes

 

                          Hyperthyroidism           Labs reviewed- pt has appt w

[facility] endocrinologist May 2021. pt 

feeling ok- continue to monitor levels



 

                          Constipation, unspecified constipation type Constipati

on: Care Instructions 

material was published

                                        Discussed trying to reset bowels since r

ecent antibiotic use- Miralax 3 capfuls

x 3 days then 1 capful daily until having regular soft BM daily then can wean 
down to keep at 1 soft BM daily Continue probiotic and increasing water intake 
and dietary fiber. Should symptoms worsen or fail to improve notify clinic.





Next Appt



                                        Details

 

                                        prn,6 Months Reason:M

 

                                        Provider Name:CELINA REHMAN, 2023 03

:20:00 PM, [address] [facility][note 3], 

[id number], [place], [address], [phone]



Follow Up:prn,6 MonthsCHM



Insurance Providers





             Payer Name   Payer Address Payer Phone  Insured Name Patient Relati

onship to 

Insured         Coverage Start Date Coverage End Date Subscriber Number Group Nu

mber

 

                [employer] [address] [facility] [id number] [phone]

[phone]    

Fabiola Juarez Self - patient is the insured 2021                       

       

 

                 [employer] [address] [address]  [place] [phone]

[phone]    

Fabiola Juarez Self - patient is the insured 2022                [id number]

[id number]   







MEDICATIONS ADMINISTERED





                Medication      Instructions    Date of Administration Dosage

 

                Dexamethasone                       4 mg

 

                ROCEPHIN 250 MG (IM)                     1 mL

## 2023-02-13 NOTE — XMS REPORT
Summarization of Episode Note

                             Created on: 2023



Fabiola Juarez MATT

External Reference #: [id number]

: 1995

Sex: Female



Demographics





                          Address                   [address] [place]  [address]

 

                          Home Phone                [phone]

 

                          Preferred Language        Unknown

 

                          Marital Status            unmarried

 

                          Jain Affiliation     Unknown

 

                          Race                      White

 

                          Ethnic Group              Not  or 





Author





                          Author                    [facility]

 

                          Address                   Unknown

 

                          Phone                     Unavailable







Support





                Name            Relationship    Address         Phone

 

                    Fabiola Juarez     GUAR                [address] [place]  [address] [phone]

 

                    JuarezTheodora     ECON                [address] [place]  [address] [phone]







Care Team Providers





                    Care Team Member Name Role                Phone

 

                    JOSE,  KALYN      Unavailable         [phone]







PROBLEMS





          Type      Condition ICD9-CM Code IAI55-IA Code Onset Dates Condition S

tatus W/U 

Status              Risk                SNOMED Code         Notes

 

       Problem Prenatal care in third trimester        Z34.93               conf

irmed        [id number]  

 

                Problem         Gastroesophageal reflux disease, esophagitis pre

sence not specified                 

K21.9                            confirmed             [id number]   

 

       Problem Thyroid cyst        E04.1                confirmed        [id number]

4  

 

       Problem Hypothyroidism, unspecified        E03.9                confirmed

        [id number]  

 

                          Problem                   Endocrine, nutritional and m

etabolic diseases complicating pregnancy, 

unspecified trimester         O99.280                 confirmed         [id number]

2  

 

       Problem Anemia during pregnancy in third trimester        O99.013        

       confirmed                

 

       Problem Hyperthyroidism        E05.90               confirmed        3448

6009  

 

        Problem Seasonal allergic rhinitis, unspecified trigger         J30.2   

                confirmed         

[id number]                                

 

          Problem   Gastroesophageal reflux disease without esophagitis         

  K21.9                         

confirmed                               [id number]            

 

             Problem      Thyrotoxicosis, unspecified without thyrotoxic crisis 

or storm              E05.90       

                          confirmed                 [id number]      







ALLERGIES





                    Allergen (clinical drug ingredient) Drug/Non Drug Allergy do

cumented on EMR 

Reaction            Allergy Type        Onset Date          Status

 

           azithromycin Azithromycin(NDC Code:[address]-11) hives      Drug All

ergy            Active







ENCOUNTERS from 1996 to 2023





             Encounter    Location     Date         Provider     Diagnosis

 

                          [facility] [address]

[id number] 

[address]          02 Mar, 2021        KALYN GARCIA         







IMMUNIZATIONS





                Vaccine         Route           Administration Date Status

 

                DTP             Unknown         1996    Administered

 

                STATE FUNDED GARDASIL 9 (HPV) IM Intramuscular Oct 25, 2019    A

dministered

 

                PRIVATE GARDASIL 9 (HPV) IM Intramuscular 2019    Admini

stered

 

                PRIVATE TDAP (BOOSTRIX) IM Intramuscular 2022    Adminis

tered

 

                Influenza (split), preservative free, 6-35 months Unknown       

  2004    

Administered

 

                Influenza (split), preservative free, 6-35 months Unknown       

  Dec 05, 2003    

Administered

 

                DTP             Unknown         Dec 01, 1997    Administered

 

                DTP             Unknown         1997    Administered

 

                influenza LAIV nasal (history) Unknown         2005    A

dministered

 

                polio ipv (history) Unknown         2000   Administered

 

                varivax (history) Unknown         Oct 13, 1997    Administered

 

                dt (history)    Unknown         1999   Administered

 

                tenivac td (history) Unknown         2012    Administere

d

 

                engerix hepatitis b pediatric/adolescent (history) Unknown      

   Oct 13, 1997    

Administered

 

                acthib hib prp-t (history) Unknown         1996    Admin

istered

 

                acthib hib prp-t (history) Unknown         1997    Admin

istered

 

                acthib hib prp-t (history) Unknown         Dec 01, 1997    Admin

istered

 

                hepatitis b pediatric (history) Unknown         1996    

Administered

 

                hepatitis b pediatric (history) Unknown         1996   

Administered

 

                OPV             Unknown         Dec 01, 1997    Administered

 

                OPV             Unknown         1997    Administered

 

                mmr-II (history) Unknown         2000   Administered

 

                mmr-II (history) Unknown         Oct 13, 1997    Administered

 

                PRIVATE GARDASIL 9 (HPV) IM Intramuscular 2019  Admini

stered

 

                OPV             Unknown         1996    Administered

 

                Influenza (split), preservative free, 6-35 months Unknown       

  2007    

Administered

 

                Influenza (split), preservative free, 6-35 months Unknown       

  Oct 15, 2004    

Administered







SOCIAL HISTORY

Sex Assigned At Birth:



                          Social History Observation Description

 

                          Sex Assigned At Birth     Unknown



Alcohol Screen (Audit-C)



                    Question            Answer              Notes

 

                    Did you have a drink containing alcohol in the past year? Ye

s                  

 

                    Points              1                    

 

                    Interpretation      Negative             

 

                          How often did you have 6 or more drinks on one occasio

n in the past year? Never 

(0 points)                               

 

                                        How many drinks did you have on a typica

l day when you were drinking in the past

year?                     1 or 2 (0 points)          

 

                          How often did you have a drink containing alcohol in t

he past year? Monthly or 

less (1 point)                           



DAST-10 ( Edition)



                    Question            Answer              Notes

 

                    1. Have you used drugs other than those required for medical

 reasons? No                   

 

                    2. Do you abuse more than one drug at a time? No            

       

 

                    3. Are you always able to stop using drugs when you want to?

 No                   

 

                    4. Have you had "blackouts" or "flashbacks" as a result of d

rug use? No                   

 

                    5. Do you ever feel bad or guilty about your drug use? No   

                

 

                                        6. Does your spouse (or parents) ever co

mplain about your involvement with 

drugs?                    No                         

 

                    7. Have you neglected your family because of your use of debbi

gs? No                   

 

                    8. Have you engaged in illegal activities in order to obtain

 drugs? No                   

 

                                        9. Have you ever experienced withdrawal 

symptoms (felt sick) when you stopped 

taking drugs?             No                         

 

                                        10. Have you had medical problems as a r

esult of your drug use (e.g., memory 

loss, hepatitis, convulsions, bleeding etc.)? No                         

 

                    Results:            1                    

 

                    Interpretation of Score: Low level            



Sexual History



                    Question            Answer              Notes

 

                    Had sex in the past 12 months (vaginal, oral, or anal)? Yes 

                 

 

                    Last menstrual period 2019           

 

                    Have you ever had a Sexually transmitted disease? No        

           

 

                    with                Men only             

 

                    Use protection?     No                   



PHQ2



                    Question            Answer              Notes

 

                                        In the last 2 weeks, how often have you 

had little interest or pleasure in doing

things?                   Not at all                 

 

                                        In the last 2 weeks, how often have you 

been feeling down, depressed, or 

hopeless?                 Not at all                 

 

                    Total PHQ2 Score    0                    







REASON FOR REFERRAL

No Information



VITAL SIGNS

No information



MEDICATIONS





           Medication SIG (Take, Route, Frequency, Duration) Notes      Start Da

te End Date   

Status

 

                    Pantoprazole Sodium 40 MG 1 tablet Orally Once a day after 3

 months need to 

switch to 20mg dose 26 May, 2022                            Active

 

             Docusate Sodium 100 MG 1 capsule Orally Twice a day for 30 days    

            

                                        Active

 

                          Albuterol Sulfate  (90 Base) MCG/ACT 1 puff as 

needed Inhalation every 4 

hrs for 30 days prn                                 Active

 

           Prenatal                                               Active

 

                Iron (Ferrous Sulfate) 325 (65 fe) mg 1 tablet Orally Once a day

 for 30 days                 

                                        Active

 

           Probiotic - as directed Orally                                  Activ

e

 

                    Famotidine 20 MG    1 tablet Orally Twice a day for 30 days 

In addition to 

Protonix.           11 Oct, 2022                            Active







PROCEDURES

No Information



RESULTS

No Results



REASON FOR VISIT

Lab results



MEDICAL (GENERAL) HISTORY





                    Type                Description         Date

 

                    Medical History     Anxiety and panic attacks, patient repor

glen  

 

                    Medical History     Depression, patient reported  

 

                    Medical History     Scoliosis            

 

                    Medical History     asthma - mild intermittent  

 

                    Surgical History    Tubes in ears        

 

                    Hospitalization History see surgeries        







Goals Section

No Information



Health Concerns

No Information



MEDICAL EQUIPMENT

No Information



MENTAL STATUS

No Information



FUNCTIONAL STATUS

No Information



ASSESSMENTS

No Information



PLAN OF TREATMENT

Next Appt



                                        Details

 

                                        Provider Name:CELINA REHMAN, 2023 03

:40:00 PM, [address] [place] [address], 

[id number], [place], [address], [phone]

 

                                        Provider Name:CELINA REHMAN, 2023 03

:40:00 PM, [address] [place] [address], 

[id number], [place], [address], [phone]







Insurance Providers





             Payer Name   Payer Address Payer Phone  Insured Name Patient Relati

onship to 

Insured         Coverage Start Date Coverage End Date Subscriber Number Group Nu

mber

 

                [email or web address] [id number] [facility] [id number] [phone]

10    

Fabiola Juarez Self - patient is the insured 2021                       

       

 

                 [employer] [facility] [address] [address]  [place] [address] 85

[phone]    

Fabiola Juarez Self - patient is the insured 2022                [id number]

[id number]   







MEDICATIONS ADMINISTERED





                Medication      Instructions    Date of Administration Dosage

 

                ROCEPHIN 250 MG (IM)                     1 mL

 

                Dexamethasone                       4 mg

## 2023-02-14 VITALS — SYSTOLIC BLOOD PRESSURE: 106 MMHG | DIASTOLIC BLOOD PRESSURE: 68 MMHG

## 2023-02-14 VITALS — SYSTOLIC BLOOD PRESSURE: 132 MMHG | DIASTOLIC BLOOD PRESSURE: 69 MMHG

## 2023-02-14 VITALS — SYSTOLIC BLOOD PRESSURE: 113 MMHG | DIASTOLIC BLOOD PRESSURE: 77 MMHG

## 2023-02-14 VITALS — DIASTOLIC BLOOD PRESSURE: 56 MMHG | SYSTOLIC BLOOD PRESSURE: 103 MMHG

## 2023-02-14 VITALS — DIASTOLIC BLOOD PRESSURE: 91 MMHG | SYSTOLIC BLOOD PRESSURE: 129 MMHG

## 2023-02-14 VITALS — DIASTOLIC BLOOD PRESSURE: 55 MMHG | SYSTOLIC BLOOD PRESSURE: 99 MMHG

## 2023-02-14 VITALS — DIASTOLIC BLOOD PRESSURE: 77 MMHG | SYSTOLIC BLOOD PRESSURE: 129 MMHG

## 2023-02-14 VITALS — DIASTOLIC BLOOD PRESSURE: 70 MMHG | SYSTOLIC BLOOD PRESSURE: 106 MMHG

## 2023-02-14 VITALS — SYSTOLIC BLOOD PRESSURE: 127 MMHG | DIASTOLIC BLOOD PRESSURE: 67 MMHG

## 2023-02-14 VITALS — SYSTOLIC BLOOD PRESSURE: 119 MMHG | DIASTOLIC BLOOD PRESSURE: 77 MMHG

## 2023-02-14 VITALS — SYSTOLIC BLOOD PRESSURE: 111 MMHG | DIASTOLIC BLOOD PRESSURE: 68 MMHG

## 2023-02-14 VITALS — DIASTOLIC BLOOD PRESSURE: 58 MMHG | SYSTOLIC BLOOD PRESSURE: 105 MMHG

## 2023-02-14 VITALS — SYSTOLIC BLOOD PRESSURE: 97 MMHG | DIASTOLIC BLOOD PRESSURE: 60 MMHG

## 2023-02-14 VITALS — DIASTOLIC BLOOD PRESSURE: 75 MMHG | SYSTOLIC BLOOD PRESSURE: 108 MMHG

## 2023-02-14 VITALS — SYSTOLIC BLOOD PRESSURE: 98 MMHG | DIASTOLIC BLOOD PRESSURE: 57 MMHG

## 2023-02-14 VITALS — SYSTOLIC BLOOD PRESSURE: 128 MMHG | DIASTOLIC BLOOD PRESSURE: 77 MMHG

## 2023-02-14 VITALS — DIASTOLIC BLOOD PRESSURE: 71 MMHG | SYSTOLIC BLOOD PRESSURE: 118 MMHG

## 2023-02-14 VITALS — DIASTOLIC BLOOD PRESSURE: 75 MMHG | SYSTOLIC BLOOD PRESSURE: 120 MMHG

## 2023-02-14 VITALS — SYSTOLIC BLOOD PRESSURE: 104 MMHG | DIASTOLIC BLOOD PRESSURE: 56 MMHG

## 2023-02-14 VITALS — SYSTOLIC BLOOD PRESSURE: 112 MMHG | DIASTOLIC BLOOD PRESSURE: 69 MMHG

## 2023-02-14 VITALS — SYSTOLIC BLOOD PRESSURE: 115 MMHG | DIASTOLIC BLOOD PRESSURE: 62 MMHG

## 2023-02-14 VITALS — SYSTOLIC BLOOD PRESSURE: 114 MMHG | DIASTOLIC BLOOD PRESSURE: 67 MMHG

## 2023-02-14 VITALS — DIASTOLIC BLOOD PRESSURE: 71 MMHG | SYSTOLIC BLOOD PRESSURE: 117 MMHG

## 2023-02-14 VITALS — DIASTOLIC BLOOD PRESSURE: 61 MMHG | SYSTOLIC BLOOD PRESSURE: 112 MMHG

## 2023-02-14 VITALS — SYSTOLIC BLOOD PRESSURE: 117 MMHG | DIASTOLIC BLOOD PRESSURE: 69 MMHG

## 2023-02-14 VITALS — DIASTOLIC BLOOD PRESSURE: 71 MMHG | SYSTOLIC BLOOD PRESSURE: 124 MMHG

## 2023-02-14 VITALS — DIASTOLIC BLOOD PRESSURE: 77 MMHG | SYSTOLIC BLOOD PRESSURE: 107 MMHG

## 2023-02-14 VITALS — SYSTOLIC BLOOD PRESSURE: 102 MMHG | DIASTOLIC BLOOD PRESSURE: 56 MMHG

## 2023-02-14 VITALS — DIASTOLIC BLOOD PRESSURE: 73 MMHG | SYSTOLIC BLOOD PRESSURE: 126 MMHG

## 2023-02-14 VITALS — DIASTOLIC BLOOD PRESSURE: 67 MMHG | SYSTOLIC BLOOD PRESSURE: 114 MMHG

## 2023-02-14 VITALS — SYSTOLIC BLOOD PRESSURE: 119 MMHG | DIASTOLIC BLOOD PRESSURE: 75 MMHG

## 2023-02-14 VITALS — DIASTOLIC BLOOD PRESSURE: 68 MMHG | SYSTOLIC BLOOD PRESSURE: 111 MMHG

## 2023-02-14 VITALS — SYSTOLIC BLOOD PRESSURE: 117 MMHG | DIASTOLIC BLOOD PRESSURE: 71 MMHG

## 2023-02-14 VITALS — SYSTOLIC BLOOD PRESSURE: 113 MMHG | DIASTOLIC BLOOD PRESSURE: 65 MMHG

## 2023-02-14 VITALS — DIASTOLIC BLOOD PRESSURE: 63 MMHG | SYSTOLIC BLOOD PRESSURE: 109 MMHG

## 2023-02-14 VITALS — SYSTOLIC BLOOD PRESSURE: 118 MMHG | DIASTOLIC BLOOD PRESSURE: 58 MMHG

## 2023-02-14 VITALS — SYSTOLIC BLOOD PRESSURE: 121 MMHG | DIASTOLIC BLOOD PRESSURE: 81 MMHG

## 2023-02-14 VITALS — SYSTOLIC BLOOD PRESSURE: 118 MMHG | DIASTOLIC BLOOD PRESSURE: 71 MMHG

## 2023-02-14 VITALS — DIASTOLIC BLOOD PRESSURE: 64 MMHG | SYSTOLIC BLOOD PRESSURE: 107 MMHG

## 2023-02-14 VITALS — DIASTOLIC BLOOD PRESSURE: 59 MMHG | SYSTOLIC BLOOD PRESSURE: 116 MMHG

## 2023-02-14 VITALS — DIASTOLIC BLOOD PRESSURE: 75 MMHG | SYSTOLIC BLOOD PRESSURE: 118 MMHG

## 2023-02-14 VITALS — SYSTOLIC BLOOD PRESSURE: 129 MMHG | DIASTOLIC BLOOD PRESSURE: 72 MMHG

## 2023-02-14 VITALS — DIASTOLIC BLOOD PRESSURE: 72 MMHG | SYSTOLIC BLOOD PRESSURE: 110 MMHG

## 2023-02-14 VITALS — DIASTOLIC BLOOD PRESSURE: 67 MMHG | SYSTOLIC BLOOD PRESSURE: 127 MMHG

## 2023-02-14 VITALS — SYSTOLIC BLOOD PRESSURE: 120 MMHG | DIASTOLIC BLOOD PRESSURE: 70 MMHG

## 2023-02-14 VITALS — DIASTOLIC BLOOD PRESSURE: 73 MMHG | SYSTOLIC BLOOD PRESSURE: 116 MMHG

## 2023-02-14 VITALS — DIASTOLIC BLOOD PRESSURE: 62 MMHG | SYSTOLIC BLOOD PRESSURE: 108 MMHG

## 2023-02-14 VITALS — DIASTOLIC BLOOD PRESSURE: 63 MMHG | SYSTOLIC BLOOD PRESSURE: 129 MMHG

## 2023-02-14 VITALS — SYSTOLIC BLOOD PRESSURE: 112 MMHG | DIASTOLIC BLOOD PRESSURE: 76 MMHG

## 2023-02-14 VITALS — DIASTOLIC BLOOD PRESSURE: 79 MMHG | SYSTOLIC BLOOD PRESSURE: 119 MMHG

## 2023-02-14 PROCEDURE — 3E0DXGC INTRODUCTION OF OTHER THERAPEUTIC SUBSTANCE INTO MOUTH AND PHARYNX, EXTERNAL APPROACH: ICD-10-PCS | Performed by: FAMILY MEDICINE

## 2023-02-14 PROCEDURE — 10907ZC DRAINAGE OF AMNIOTIC FLUID, THERAPEUTIC FROM PRODUCTS OF CONCEPTION, VIA NATURAL OR ARTIFICIAL OPENING: ICD-10-PCS | Performed by: FAMILY MEDICINE

## 2023-02-14 PROCEDURE — 0KQM0ZZ REPAIR PERINEUM MUSCLE, OPEN APPROACH: ICD-10-PCS | Performed by: FAMILY MEDICINE

## 2023-02-14 PROCEDURE — 0UQMXZZ REPAIR VULVA, EXTERNAL APPROACH: ICD-10-PCS | Performed by: FAMILY MEDICINE

## 2023-02-14 RX ADMIN — IBUPROFEN SCH MG: 600 TABLET ORAL at 23:53

## 2023-02-14 RX ADMIN — SODIUM CHLORIDE, SODIUM LACTATE, POTASSIUM CHLORIDE, CALCIUM CHLORIDE, AND DEXTROSE MONOHYDRATE SCH MLS/HR: 600; 310; 30; 20; 5 INJECTION, SOLUTION INTRAVENOUS at 04:39

## 2023-02-14 RX ADMIN — DOCUSATE SODIUM SCH MG: 100 CAPSULE ORAL at 20:54

## 2023-02-14 RX ADMIN — SODIUM CHLORIDE, SODIUM LACTATE, POTASSIUM CHLORIDE, CALCIUM CHLORIDE, AND DEXTROSE MONOHYDRATE SCH MLS/HR: 600; 310; 30; 20; 5 INJECTION, SOLUTION INTRAVENOUS at 12:00

## 2023-02-14 RX ADMIN — ACETAMINOPHEN SCH MG: 500 TABLET ORAL at 17:51

## 2023-02-14 RX ADMIN — ACETAMINOPHEN SCH MG: 500 TABLET ORAL at 23:52

## 2023-02-14 RX ADMIN — IBUPROFEN SCH MG: 600 TABLET ORAL at 17:51

## 2023-02-14 RX ADMIN — Medication SCH MLS/HR: at 19:40

## 2023-02-14 RX ADMIN — Medication SCH ML: at 05:26

## 2023-02-14 RX ADMIN — Medication SCH MLS/HR: at 14:49

## 2023-02-14 NOTE — HISTORY & PHYSICAL-OB
JULIOOVI 23 0700:


OB - Chief Complaint & HPI


Date/Time


Date of Admission:


Date of Admission:  2023 at 18:19


Date seen by a Provider:  2023


Time Seen by a Provider:  07:10





Chief Complaint/History


OB-Reason for Admission/Chief:  Induction of Labor


Hx :  2


Hx Para:  0


Expected Date of Delivery:  2023


Gestational Age in Weeks:  40


Gestational Age in Days:  4


Indication for induction:  post dates


Admission Nurse Assessment Rev:  Yes





Allergies and Home Medications


Allergies


Coded Allergies:  


     azithromycin (Verified  Allergy, Mild, Hives, 22)


     latex (Verified  Allergy, Mild, Itching, 23)





Patient Home Medication List


Home Medication List Reviewed:  Yes


Albuterol Sulfate (Ventolin Hfa) 90 Mcg Hfa.aer.ad, 18 GM INH Q4H PRN for 

WHEEZING


   Prescribed by: ABILIO RUIZ on 11/3/22 1129


Famotidine (Acid Reducer (FAMOTIDINE)) 20 Mg Tablet, 20 MG PO DAILY, (Reported)


   Entered as Reported by: IBRAHIMA DIAZ on 22


Ferrous Sulfate (Ferrous Sulfate) 325 Mg (65 Mg Iron) Tablet, 325 MG PO DAILY, 

(Reported)


   Entered as Reported by: IBRAHIMA DIAZ on 22


Lactobacillus Rhamnosus  (Probiotic Digestive Care) Unknown Strength 

Capsule, Unknown Dose PO DAILY, (Reported)


   Entered as Reported by: IBRAHIMA DIAZ on 22


Ondansetron (Ondansetron Odt) 4 Mg Tab.rapdis, 4 MG SL Q6H PRN for 

NAUSEA/VOMITING


   Prescribed by: ABILIO RUIZ on 11/3/22 1129


Prenatal Vit W-Ca,Fe,FA(<1 mg) (Prenatal Formula) 28 Mg Iron-800 Mcg Tablet, 1 

EACH PO DAILY, (Reported)


   Entered as Reported by: IBRAHIMA DIAZ on 22





OB - History


Hx of Present Pregnancy


Prenatal Care:  Yes


Ultrasounds:  Normal mid trimester US


Obstetrical Complications:  None


Medical Complications:  None





Prenatal Information


Pregnancy Induced Hypertension:  No


Maternal Gestational Diabetes:  No





Obstetrical History


Hx Pregnancy Termination:  No


Hx Total # of Abortions (Spona:  1


Hx Multiple Gestation:  No


Hx Ectopic Pregnancy:  No


Hx Stillbirth:  No


Hx Pregnancy Complication:  No


Hx Pregnancy Induced Hypertens:  No


Hx Maternal Gestational Diabet:  No





Patient Past Medical History





PMH includes scoliosis, depression, and anxiety.





Social History/Family History


Alcohol Use:  Denies Use


Recreational Drug Use:  No


Smoking Cessation:  Never smoker


2nd Hand Smoke Exposure:  No





Immunizations


Influenza Vaccine Up-to-Date:  No; Not Current


Hepatitis A:  Yes


Hepatitis B:  Yes


Tetanus Booster (TDap):  Less than 5yrs


Rubella:  immune


RPR/VDRL:  Negative


GBS Status:  Negative


HBsAG:  Negative





OB - Admission Exam


Physical Exam


Vitals:





Vital Signs








 23





 03:05 05:05


 


Temp  36.8


 


Pulse  77


 


Resp  18


 


B/P (MAP)  119/77 (91)


 


Pulse Ox 96 


 


O2 Delivery  Room Air








HEENT:  Moist Membranes


Heart:  Rhythm Normal


Lungs:  Clear


Abdomen:  Non tender


Membranes:  Intact


Fetal Heart Rate:  120's


Accelerations:  Accelerations Present


Decelerations:  No Decelerations


Short Term Variability:  Present


Long Term Variability:  Average (6-25)


Contractions on Admission:  < 5 Minutes Apart


Date/Time Contractions Began;:  Yesterday evening


Frequency of Contractions:  Every 3-4 minutes


Duration:  A minute


Intensity:  Moderate


Labs





Laboratory Tests








Test


 23


19:45 23


20:22 Range/Units


 


 


White Blood Count


 8.8 


 


 4.3-11.0


10^3/uL


 


Red Blood Count


 3.69 L


 


 3.80-5.11


10^6/uL


 


Hemoglobin 10.5 L  11.5-16.0  g/dL


 


Hematocrit 32 L  35-52  %


 


Mean Corpuscular Volume 86   80-99  fL


 


Mean Corpuscular Hemoglobin 29   25-34  pg


 


Mean Corpuscular Hemoglobin


Concent 33 


 


 32-36  g/dL





 


Red Cell Distribution Width 16.9 H  10.0-14.5  %


 


Platelet Count


 219 


 


 130-400


10^3/uL


 


Mean Platelet Volume 11.9   9.0-12.2  fL


 


Immature Granulocyte % (Auto) 1    %


 


Neutrophils (%) (Auto) 67   42-75  %


 


Lymphocytes (%) (Auto) 25   12-44  %


 


Monocytes (%) (Auto) 6   0-12  %


 


Eosinophils (%) (Auto) 1   0-10  %


 


Basophils (%) (Auto) 1   0-10  %


 


Neutrophils # (Auto)


 5.9 


 


 1.8-7.8


10^3/uL


 


Lymphocytes # (Auto)


 2.2 


 


 1.0-4.0


10^3/uL


 


Monocytes # (Auto)


 0.5 


 


 0.0-1.0


10^3/uL


 


Eosinophils # (Auto)


 0.1 


 


 0.0-0.3


10^3/uL


 


Basophils # (Auto)


 0.0 


 


 0.0-0.1


10^3/uL


 


Immature Granulocyte # (Auto)


 0.1 


 


 0.0-0.1


10^3/uL











OB - Assessment/Plan/Diagnosis


Assessment


Assessment:  induction of labor


Admission Dx


28yo F  @ 40w4d presenting for induction of labor. Patient has not had any 

prenatal complications. Patient was admitted yesterday evening for cervical 

ripening. Patient began having contractions yesterday evening that have 

progressively gotten closer together. Patient was not able to sleep well last 

night because of contractions. She says her contractions are 3-4 minutes apart 

now and she is feeling a lot of pressure in her lower abdomen. Patient denies 

vaginal bleeding, gush of fluid, fevers, chills, chest pain, shortness of 

breath, and lower extremity edema.


Admission Status:  Observation





Plan


Plan:  Induction


Induction Method:  per Misoprostol Protocol


Other Plan


Amniotomy will be performed. Cervical checks will be done to monitor progression

of labor and fetal monitoring will be done as well. Expectant management will be

done.





CELINA REHMAN MD 23 1646:


Allergies and Home Medications


Allergies


Coded Allergies:  


     azithromycin (Verified  Allergy, Mild, Hives, 22)


     latex (Verified  Allergy, Mild, Itching, 23)





Patient Home Medication List


Albuterol Sulfate (Ventolin Hfa) 90 Mcg Hfa.aer.ad, 18 GM INH Q4H PRN for 

WHEEZING


   Prescribed by: ABILIO RUIZ on 11/3/22 1129


Famotidine (Acid Reducer (FAMOTIDINE)) 20 Mg Tablet, 20 MG PO DAILY, (Reported)


   Entered as Reported by: IBRAHIMA DIAZ on 22 0013


Ferrous Sulfate (Ferrous Sulfate) 325 Mg (65 Mg Iron) Tablet, 325 MG PO DAILY, 

(Reported)


   Entered as Reported by: IBRAHIMA DIAZ on 22


Lactobacillus Rhamnosus  (Probiotic Digestive Care) Unknown Strength 

Capsule, Unknown Dose PO DAILY, (Reported)


   Entered as Reported by: IBRAHIMA DIAZ on 22


Ondansetron (Ondansetron Odt) 4 Mg Tab.rapdis, 4 MG SL Q6H PRN for 

NAUSEA/VOMITING


   Prescribed by: ABILIO RUIZ on 11/3/22 1129


Prenatal Vit W-Ca,Fe,FA(<1 mg) (Prenatal Formula) 28 Mg Iron-800 Mcg Tablet, 1 

EACH PO DAILY, (Reported)


   Entered as Reported by: IBRAHIMA DIAZ on 22





Supervisory-Addendum Brief


Supervisory Addendum


Verification and Attestation of Medical Student E/M Service





A medical student performed and documented this service in my presence. I 

reviewed and verified all information documented by the medical student and made

modifications to such information, when appropriate. I personally performed the 

physical exam and medical decision making. 





 Celina Rehman, 2023,16:46











OVI JULIO                    2023 07:00


CELINA REHMAN MD               2023 16:46

## 2023-02-14 NOTE — LABOR PROGRESS NOTE
Labor Progress Note


Labor Progress Note


Date Seen by Provider:  2023


Time Seen by Provider:  09:45


Subjective:


Pt denies complaints. Feeling better after epidural placement





Objective:


3/80/-1


AROM clear





Assessment/Plan:


Fabiola Juarez  is a (27  /Para  2 / 0,Gestational Age (wks)40.4 here for

IOL for post dates


CEFM/TOCO


AROM clear 


Epidural in place


Will start augmentation with pitocin


GBS neg





Vitals - Labs


Vital Signs - I&O





Vital Signs








  Date Time  Temp Pulse Resp B/P (MAP) Pulse Ox O2 Delivery O2 Flow Rate FiO2


 


23 09:30  81 18 115/62 (79) 100 Room Air  


 


23 09:28  89 18 116/73 (87) 97 Room Air  


 


23 09:25  88 18 118/75 (89) 97 Room Air  


 


23 09:20  83 18 119/77 (91) 97 Room Air  


 


23 09:15  76 18 112/69 (83) 98 Room Air  


 


23 09:12  82 18 111/68 (82) 97 Room Air  


 


23 09:09  80 18 112/69 (83) 97 Room Air  


 


23 09:06  81 18 111/68 (82) 98 Room Air  


 


23 09:03  82 18 113/65 (81) 98 Room Air  


 


23 09:00  92 18 117/69 (85)  Room Air  


 


23 08:55  89 18 114/67 (83) 98 Room Air  


 


23 08:50  100 18 124/71 (88) 100 Room Air  


 


23 08:45  92 18 117/69 (85)  Room Air  


 


23 08:43  107 18 129/77 (94) 100 Room Air  


 


23 08:41  112 18 128/77 (94) 100 Room Air  


 


23 08:38  123 18 126/73 (90) 100 Room Air  


 


23 08:35  112 18 129/72 (91) 99 Room Air  


 


23 08:10  117 18 129/91 (104)  Room Air  


 


23 07:40 36.3 83 18 120/70 (87)  Room Air  


 


23 07:07  96 18 116/59 (78)  Room Air  


 


23 06:05  91 18 113/77 (89)  Room Air  


 


23 05:05 36.8 77 18 119/77 (91)  Room Air  


 


23 04:05  78 18 107/64 (78)  Room Air  


 


23 03:05 36.9 80 18 117/71 (86) 96 Room Air  


 


23 02:05  86 18 129/63 (85)  Room Air  


 


23 01:31  79 18 106/68 (81)  Room Air  


 


23 01:01  83 18 114/67 (83)  Room Air  


 


23 00:31  76 18 118/71 (87)  Room Air  


 


23 00:02 36.9 79 18 118/71 (87) 96 Room Air  


 


23 23:31  80 18 116/71 (86)  Room Air  


 


23 23:01  83 18 108/65 (79)  Room Air  


 


23 22:31  82 18 112/69 (83)  Room Air  


 


23 22:02  83 18 109/66 (80)  Room Air  


 


23 21:30  83 18 118/72 (87)  Room Air  


 


23 21:00  80 18 119/75 (90)  Room Air  


 


23 19:27 37.1 89 18  96 Room Air  


 


23 19:27 37.1 89 18 126/86 (99) 96 Room Air  














I & O 


 


 23





 07:00


 


Intake Total 2000 ml


 


Balance 2000 ml











Labs


Laboratory Tests


23 19:45: 


White Blood Count 8.8, Red Blood Count 3.69L, Hemoglobin 10.5L, Hematocrit 32L, 

Mean Corpuscular Volume 86, Mean Corpuscular Hemoglobin 29, Mean Corpuscular 

Hemoglobin Concent 33, Red Cell Distribution Width 16.9H, Platelet Count 219, 

Mean Platelet Volume 11.9, Immature Granulocyte % (Auto) 1, Neutrophils (%) 

(Auto) 67, Lymphocytes (%) (Auto) 25, Monocytes (%) (Auto) 6, Eosinophils (%) 

(Auto) 1, Basophils (%) (Auto) 1, Neutrophils # (Auto) 5.9, Lymphocytes # (Auto)

2.2, Monocytes # (Auto) 0.5, Eosinophils # (Auto) 0.1, Basophils # (Auto) 0.0, 

Immature Granulocyte # (Auto) 0.1


23 20:22: 











CELINA REHMAN MD               2023 10:17

## 2023-02-14 NOTE — OB LABOR & DELIVERY RECORD
Vag Delivery Note


Vag Delivery Note


Date of Delivery: 23 





Preoperative Diagnosis: Fabiola Juarez  is a (27  /Para  2 / 0,

Gestational Age (wks)40.4 here for IOL for post dates





Postoperative Diagnosis: Same


Surgeon: CELINA REHMAN MD 





Assistant: None





Anesthesia: Epidural





Delivery Type:  @ 1407





Findings: 


Viable male infant, apgars 4/8/9, weight 7#4, 3289 grams


Lacerations: 2nd degree perineal laceration and left labial


Intact placenta with 3 vessel cord. Nuchal cord x1. No body cord or shoulder 

dystocia


Low Vacuum extraction





Estimated Blood Loss: 350 ml





Complications: None





Condition: Stable





Description of Procedure:





The patient is a 27 year old female who presented for IOL. She was admitted and 

informed consent was obtained. Her labor course was remarkable for pitocin 

augmentation and low vacuum extraction due to fetal bradycardia. She progressed 

to complete dilatation and began to push. 





She was then set up for delivery. The infant's head was delivered atraumatically

in the SARA position after low vacuum extraction, vacuum placed on 1404, 1405 pop

off, vacuum replaced @ 1406 and head was delivered at 1407. The shoulders and 

remainder of the infant's body were then delivered without difficulty. Upon 

delivery, the infant was pale and limp, cord was clamped and cut and baby was 

taken to warmer by nursery nurse immediately. An intact placenta with 3-vessel 

cord delivered via Clemente and there was found to be minimal bleeding.~ Vigorous

fundal massage was performed and the fundus was found to be firm. IV oxytocin 

was given. Examination of the vagina and perineum revealed a 2nd degree perineal

and left labial laceration repaired in the usual fashion with 3-0 vicryl rapide 

suture. Following the repair, sponge, instrument and needle counts were correct.

Mom and baby were both in stable condition in the labor suite.





Vitals - Labs


Vital Signs - I&O





Vital Signs








  Date Time  Temp Pulse Resp B/P (MAP) Pulse Ox O2 Delivery O2 Flow Rate FiO2


 


23 14:00   18   Non Rebreather 15.00 


 


23 13:45   18   Non Rebreather 15.00 


 


23 13:30 37.2  18 107/77 (87)  Room Air  


 


23 13:15  91 18 119/79 (92)  Room Air  


 


23 13:00  104 18 121/81 (94)  Room Air  


 


23 12:45  98 18 120/75 (90)  Room Air  


 


23 12:30  99 18 108/62 (77)  Room Air  


 


23 12:15  106 18 114/67 (83)  Room Air  


 


23 12:00  88 18 119/75 (90)  Room Air  


 


23 11:45 37.0 86 18 132/69 (90)  Room Air  


 


23 11:30  82 18 105/58 (74)  Room Air  


 


23 11:15  89 18 104/56 (72)  Room Air  


 


23 11:00  89 18 103/56 (72)  Room Air  


 


23 10:45      Room Air  


 


23 10:30 37.1 76 18 102/56 (71)  Room Air  


 


23 10:15  93 18 117/71 (86)  Room Air  


 


23 10:00      Room Air  


 


23 09:50  81 18 99/55 (70)  Room Air  


 


23 09:45  82 18 110/72 (85) 100 Room Air  


 


23 09:30  81 18 115/62 (79) 100 Room Air  


 


23 09:28  89 18 116/73 (87) 97 Room Air  


 


23 09:25  88 18 118/75 (89) 97 Room Air  


 


23 09:20  83 18 119/77 (91) 97 Room Air  


 


23 09:15  76 18 112/69 (83) 98 Room Air  


 


23 09:12  82 18 111/68 (82) 97 Room Air  


 


23 09:09  80 18 112/69 (83) 97 Room Air  


 


23 09:06  81 18 111/68 (82) 98 Room Air  


 


23 09:03  82 18 113/65 (81) 98 Room Air  


 


23 09:00  92 18 117/69 (85)  Room Air  


 


23 08:55  89 18 114/67 (83) 98 Room Air  


 


23 08:50  100 18 124/71 (88) 100 Room Air  


 


23 08:45  92 18 117/69 (85)  Room Air  


 


23 08:43  107 18 129/77 (94) 100 Room Air  


 


23 08:41  112 18 128/77 (94) 100 Room Air  


 


23 08:38  123 18 126/73 (90) 100 Room Air  


 


23 08:35  112 18 129/72 (91) 99 Room Air  


 


23 08:10  117 18 129/91 (104)  Room Air  


 


23 07:40 36.3 83 18 120/70 (87)  Room Air  


 


23 07:07  96 18 116/59 (78)  Room Air  


 


23 06:05  91 18 113/77 (89)  Room Air  


 


23 05:05 36.8 77 18 119/77 (91)  Room Air  


 


23 04:05  78 18 107/64 (78)  Room Air  


 


23 03:05 36.9 80 18 117/71 (86) 96 Room Air  


 


23 02:05  86 18 129/63 (85)  Room Air  


 


23 01:31  79 18 106/68 (81)  Room Air  


 


23 01:01  83 18 114/67 (83)  Room Air  


 


23 00:31  76 18 118/71 (87)  Room Air  


 


23 00:02 36.9 79 18 118/71 (87) 96 Room Air  


 


23 23:31  80 18 116/71 (86)  Room Air  


 


23 23:01  83 18 108/65 (79)  Room Air  


 


23 22:31  82 18 112/69 (83)  Room Air  


 


23 22:02  83 18 109/66 (80)  Room Air  


 


23 21:30  83 18 118/72 (87)  Room Air  


 


23 21:00  80 18 119/75 (90)  Room Air  


 


23 19:27 37.1 89 18  96 Room Air  


 


23 19:27 37.1 89 18 126/86 (99) 96 Room Air  














I & O 


 


 23





 06:59


 


Intake Total 2000 ml


 


Balance 2000 ml











Labs


Laboratory Tests


23 19:45: 


White Blood Count 8.8, Red Blood Count 3.69L, Hemoglobin 10.5L, Hematocrit 32L, 

Mean Corpuscular Volume 86, Mean Corpuscular Hemoglobin 29, Mean Corpuscular 

Hemoglobin Concent 33, Red Cell Distribution Width 16.9H, Platelet Count 219, 

Mean Platelet Volume 11.9, Immature Granulocyte % (Auto) 1, Neutrophils (%) 

(Auto) 67, Lymphocytes (%) (Auto) 25, Monocytes (%) (Auto) 6, Eosinophils (%) 

(Auto) 1, Basophils (%) (Auto) 1, Neutrophils # (Auto) 5.9, Lymphocytes # (Auto)

2.2, Monocytes # (Auto) 0.5, Eosinophils # (Auto) 0.1, Basophils # (Auto) 0.0, 

Immature Granulocyte # (Auto) 0.1


23 20:22: 











CELINA REHMAN MD               2023 16:48

## 2023-02-15 VITALS — SYSTOLIC BLOOD PRESSURE: 110 MMHG | DIASTOLIC BLOOD PRESSURE: 71 MMHG

## 2023-02-15 VITALS — SYSTOLIC BLOOD PRESSURE: 115 MMHG | DIASTOLIC BLOOD PRESSURE: 68 MMHG

## 2023-02-15 VITALS — DIASTOLIC BLOOD PRESSURE: 57 MMHG | SYSTOLIC BLOOD PRESSURE: 88 MMHG

## 2023-02-15 VITALS — SYSTOLIC BLOOD PRESSURE: 137 MMHG | DIASTOLIC BLOOD PRESSURE: 67 MMHG

## 2023-02-15 VITALS — SYSTOLIC BLOOD PRESSURE: 103 MMHG | DIASTOLIC BLOOD PRESSURE: 53 MMHG

## 2023-02-15 LAB
BASOPHILS # BLD AUTO: 0 10^3/UL (ref 0–0.1)
BASOPHILS NFR BLD AUTO: 0 % (ref 0–10)
EOSINOPHIL # BLD AUTO: 0.1 10^3/UL (ref 0–0.3)
EOSINOPHIL NFR BLD AUTO: 1 % (ref 0–10)
HCT VFR BLD CALC: 28 % (ref 35–52)
HGB BLD-MCNC: 9.1 G/DL (ref 11.5–16)
LYMPHOCYTES # BLD AUTO: 2.3 10^3/UL (ref 1–4)
LYMPHOCYTES NFR BLD AUTO: 21 % (ref 12–44)
MANUAL DIFFERENTIAL PERFORMED BLD QL: NO
MCH RBC QN AUTO: 29 PG (ref 25–34)
MCHC RBC AUTO-ENTMCNC: 33 G/DL (ref 32–36)
MCV RBC AUTO: 87 FL (ref 80–99)
MONOCYTES # BLD AUTO: 0.8 10^3/UL (ref 0–1)
MONOCYTES NFR BLD AUTO: 7 % (ref 0–12)
NEUTROPHILS # BLD AUTO: 7.9 10^3/UL (ref 1.8–7.8)
NEUTROPHILS NFR BLD AUTO: 71 % (ref 42–75)
PLATELET # BLD: 180 10^3/UL (ref 130–400)
PMV BLD AUTO: 11.4 FL (ref 9–12.2)
WBC # BLD AUTO: 11.2 10^3/UL (ref 4.3–11)

## 2023-02-15 RX ADMIN — VITAMIN A ACETATE, .BETA.-CAROTENE, ASCORBIC ACID, CHOLECALCIFEROL, .ALPHA.-TOCOPHEROL ACETATE, DL-, THIAMINE MONONITRATE, RIBOFLAVIN, NIACINAMIDE, PYRIDOXINE HYDROCHLORIDE, FOLIC ACID, CYANOCOBALAMIN, CALCIUM CARBONATE, FERROUS FUMARATE, ZINC OXIDE, AND CUPRIC OXIDE SCH EA: 2000; 2000; 120; 400; 22; 1.84; 3; 20; 10; 1; 12; 200; 27; 25; 2 TABLET ORAL at 06:51

## 2023-02-15 RX ADMIN — DOCUSATE SODIUM SCH MG: 100 CAPSULE ORAL at 20:55

## 2023-02-15 RX ADMIN — DOCUSATE SODIUM SCH MG: 100 CAPSULE ORAL at 10:00

## 2023-02-15 RX ADMIN — FERROUS SULFATE TAB 325 MG (65 MG ELEMENTAL FE) SCH MG: 325 (65 FE) TAB at 10:00

## 2023-02-15 RX ADMIN — ACETAMINOPHEN SCH MG: 500 TABLET ORAL at 12:10

## 2023-02-15 RX ADMIN — IBUPROFEN SCH MG: 600 TABLET ORAL at 06:51

## 2023-02-15 RX ADMIN — ACETAMINOPHEN SCH MG: 500 TABLET ORAL at 18:20

## 2023-02-15 RX ADMIN — ACETAMINOPHEN SCH MG: 500 TABLET ORAL at 06:51

## 2023-02-15 RX ADMIN — IBUPROFEN SCH MG: 600 TABLET ORAL at 12:10

## 2023-02-15 RX ADMIN — IBUPROFEN SCH MG: 600 TABLET ORAL at 18:20

## 2023-02-15 NOTE — ANESTHESIA-REGIONAL POST-OP
Regional


Patient Condition


Mental Status:  Alert, Oriented x3


Circulation:  Same as Pre-Op


Headache:  Absent


Sensation:  Full Recovery


Motor Block:  Absent





Post Op Complications


Complications


None





Follow Up Care/Instructions


Patient Instructions


None needed.





Anesthesia/Patient Condition


Patient is doing well, no complaints, stable vital signs, no apparent adverse 

anesthesia problems.   


No complications reported per nursing.











KASSI NASSAR DO         Feb 15, 2023 14:10

## 2023-02-15 NOTE — POSTPARTUM PROGRESS NOTE
Postpartum Note


Postpartum Note


Postpartum Day # 1





Subjective:


Patient is without complaints. Ambulating, voiding. Tolerating a regular diet 

without nausea or vomiting. Normal lochia. Pain is well controlled with oral 

pain medications. Breast/Bottle feeding. 





Objective:





Physical Exam:


General - Alert and oriented, no apparent distress


Abdomen - Soft, appropriately tender to palpation, non-distended, fundus firm at

umbilicus


Extremities - no edema, negative Beth's bilaterally 





Assessment:


26 yo G1 now P1 post-partum day # 1, status post low vacuum vaginal delivery.


Recovering well, hemodynamically stable


Anemia of acute blood loss





Plan:


Routine postpartum care.


Encourage breast feeding, lactation consult placed


Encourage ambulation.


Ferrous sulfate supplementation.


Plan for discharge tomorrow





Vitals - Labs


Vital Signs - I&O





Vital Signs








  Date Time  Temp Pulse Resp B/P (MAP) Pulse Ox O2 Delivery O2 Flow Rate FiO2


 


2/15/23 12:51 36.4 85 16 103/53 (70) 98 Room Air  


 


2/15/23 08:15 36.6 84 18 115/68 (84) 97 Room Air  


 


2/15/23 05:00 36.2 91 18 88/57 (67) 97 Room Air  


 


2/14/23 23:53 36.0 90 18 112/76 (88) 99 Room Air  


 


2/14/23 20:54 36.8 85 18 97/60 (72) 98 Room Air  


 


2/14/23 17:50 36.8 99 18 106/70 (82) 98 Room Air  


 


2/14/23 16:35  90 18 98/57 (71)  Room Air  














I & O 


 


 2/15/23





 07:00


 


Intake Total 2700 ml


 


Balance 2700 ml











Labs


Laboratory Tests


2/15/23 05:22: 


White Blood Count 11.2H, Red Blood Count 3.18L, Hemoglobin 9.1L, Hematocrit 28L,

Mean Corpuscular Volume 87, Mean Corpuscular Hemoglobin 29, Mean Corpuscular 

Hemoglobin Concent 33, Red Cell Distribution Width 16.8H, Platelet Count 180, 

Mean Platelet Volume 11.4, Immature Granulocyte % (Auto) 1, Neutrophils (%) 

(Auto) 71, Lymphocytes (%) (Auto) 21, Monocytes (%) (Auto) 7, Eosinophils (%) 

(Auto) 1, Basophils (%) (Auto) 0, Neutrophils # (Auto) 7.9H, Lymphocytes # 

(Auto) 2.3, Monocytes # (Auto) 0.8, Eosinophils # (Auto) 0.1, Basophils # (Auto)

0.0, Immature Granulocyte # (Auto) 0.1











CELINA REHMAN MD               Feb 15, 2023 16:26

## 2023-02-16 VITALS — SYSTOLIC BLOOD PRESSURE: 116 MMHG | DIASTOLIC BLOOD PRESSURE: 64 MMHG

## 2023-02-16 VITALS — SYSTOLIC BLOOD PRESSURE: 103 MMHG | DIASTOLIC BLOOD PRESSURE: 67 MMHG

## 2023-02-16 VITALS — DIASTOLIC BLOOD PRESSURE: 77 MMHG | SYSTOLIC BLOOD PRESSURE: 113 MMHG

## 2023-02-16 RX ADMIN — IBUPROFEN SCH MG: 600 TABLET ORAL at 11:52

## 2023-02-16 RX ADMIN — ACETAMINOPHEN SCH MG: 500 TABLET ORAL at 06:39

## 2023-02-16 RX ADMIN — IBUPROFEN SCH MG: 600 TABLET ORAL at 06:39

## 2023-02-16 RX ADMIN — VITAMIN A ACETATE, .BETA.-CAROTENE, ASCORBIC ACID, CHOLECALCIFEROL, .ALPHA.-TOCOPHEROL ACETATE, DL-, THIAMINE MONONITRATE, RIBOFLAVIN, NIACINAMIDE, PYRIDOXINE HYDROCHLORIDE, FOLIC ACID, CYANOCOBALAMIN, CALCIUM CARBONATE, FERROUS FUMARATE, ZINC OXIDE, AND CUPRIC OXIDE SCH EA: 2000; 2000; 120; 400; 22; 1.84; 3; 20; 10; 1; 12; 200; 27; 25; 2 TABLET ORAL at 06:39

## 2023-02-16 RX ADMIN — DOCUSATE SODIUM SCH MG: 100 CAPSULE ORAL at 11:52

## 2023-02-16 RX ADMIN — FERROUS SULFATE TAB 325 MG (65 MG ELEMENTAL FE) SCH MG: 325 (65 FE) TAB at 11:51

## 2023-02-16 RX ADMIN — ACETAMINOPHEN SCH MG: 500 TABLET ORAL at 11:51

## 2023-02-16 RX ADMIN — ACETAMINOPHEN SCH MG: 500 TABLET ORAL at 00:35

## 2023-02-16 RX ADMIN — IBUPROFEN SCH MG: 600 TABLET ORAL at 00:38

## 2023-02-16 NOTE — DISCHARGE SUMMARY
Diagnosis/Chief Complaint


Date of Admission


Feb 13, 2023 at 18:19


Date of Discharge


2/16/2023


Admission Diagnosis


Admission Diagnosis


Third Trimester pregnancy


40 week gestation





Discharge Summary-Simple/Stand


Discharge Physical Examination


Allergies:  


Coded Allergies:  


     azithromycin (Verified  Allergy, Mild, Hives, 12/12/22)


     latex (Verified  Allergy, Mild, Itching, 2/14/23)


Vitals & I&Os





Vital Sign - Last 12Hours








  Date Time  Temp Pulse Resp B/P (MAP) Pulse Ox O2 Delivery O2 Flow Rate FiO2


 


2/16/23 06:39 36.1 83 18 103/67 (79) 98 Room Air  


 


2/14/23 14:07       15.00 











Hospital Course


See final discharge diagnosis.





Discharge


Instructions to patient/family


Please see electronic discharge instructions given to patient.


Discharge Medications


Reviewed and agree with Discharge Medication list on patient's Discharge 

Instruction sheet











CELINA REHMAN MD               Feb 16, 2023 10:19

## 2023-02-16 NOTE — DISCHARGE SUMMARY
Discharge Inst-[facility]


Reconcile Patient Problems


Problems Reviewed?:  Yes





Depart Medications


New, Converted or Re-Newed RX:  Transmitted to Pharmacy


New Medications:  


Docusate Sodium (Docusate Sodium) 100 Mg Capsule


100 MG PO BID, #28 CAP





Ibuprofen (Ibu) 600 Mg Tablet


600 MG PO Q6H, #30 TAB





 


Continued Medications:  


Albuterol Sulfate (Ventolin Hfa) 90 Mcg Hfa.aer.ad


18 GM INH Q4H PRN for WHEEZING for 30 Days, #1 EA





Ferrous Sulfate (Ferrous Sulfate) 325 Mg (65 Mg Iron) Tablet


325 MG PO DAILY, TAB





Prenatal Vit W-Ca,Fe,FA(<1 mg) (Prenatal Formula) 28 Mg Iron-800 Mcg Tablet


1 EACH PO DAILY, TAB





 


Discontinued Medications:  


Famotidine (Acid Reducer (FAMOTIDINE)) 20 Mg Tablet


20 MG PO DAILY for heartburn, TAB





Lactobacillus Rhamnosus  (Probiotic Digestive Care) Unknown Strength 

Capsule


Unknown Dose PO DAILY, CAP





Ondansetron (Ondansetron Odt) 4 Mg Tab.rapdis


4 MG SL Q6H PRN for NAUSEA/VOMITING for 5 Days, #20 TAB











Follow Up/Instructions


Goal/Follow Up:  


6 weeks with Cipriano Louis


Activity:  Activity as Tolerated


Driving Instructions:  You May Drive


Nothing Inside Vagina:  No Douching, No [place], No Tampons





Diet


Discharge Diet:  No Restrictions


Symptoms to Report to :  Swelling Increased, Bleeding Excessive, Pain I

ncreased, Fever Over 101 Degrees F











CELINA REHMAN MD               Feb 16, 2023 10:20

## 2023-10-01 ENCOUNTER — HOSPITAL ENCOUNTER (EMERGENCY)
Dept: HOSPITAL 75 - ER FS | Age: 28
LOS: 1 days | Discharge: HOME | End: 2023-10-02
Payer: MEDICAID

## 2023-10-01 VITALS — HEIGHT: 63.98 IN | WEIGHT: 176.59 LBS | BODY MASS INDEX: 30.15 KG/M2

## 2023-10-01 DIAGNOSIS — J06.9: Primary | ICD-10-CM

## 2023-10-01 DIAGNOSIS — B97.89: ICD-10-CM

## 2023-10-01 DIAGNOSIS — R00.0: ICD-10-CM

## 2023-10-01 DIAGNOSIS — Z91.040: ICD-10-CM

## 2023-10-01 DIAGNOSIS — E05.90: ICD-10-CM

## 2023-10-01 DIAGNOSIS — E86.0: ICD-10-CM

## 2023-10-01 DIAGNOSIS — Z20.822: ICD-10-CM

## 2023-10-01 LAB
ALBUMIN SERPL-MCNC: 4.2 GM/DL (ref 3.2–4.5)
ALP SERPL-CCNC: 118 U/L (ref 40–136)
ALT SERPL-CCNC: 19 U/L (ref 0–55)
BASOPHILS # BLD AUTO: 0.1 10^3/UL (ref 0–0.1)
BASOPHILS NFR BLD AUTO: 1 % (ref 0–10)
BILIRUB SERPL-MCNC: 0.9 MG/DL (ref 0.1–1)
BUN/CREAT SERPL: 13
CALCIUM SERPL-MCNC: 9 MG/DL (ref 8.5–10.1)
CHLORIDE SERPL-SCNC: 104 MMOL/L (ref 98–107)
CO2 SERPL-SCNC: 22 MMOL/L (ref 21–32)
CREAT SERPL-MCNC: 0.6 MG/DL (ref 0.6–1.3)
EOSINOPHIL # BLD AUTO: 0 10^3/UL (ref 0–0.3)
EOSINOPHIL NFR BLD AUTO: 0 % (ref 0–10)
GFR SERPLBLD BASED ON 1.73 SQ M-ARVRAT: 126 ML/MIN
GLUCOSE SERPL-MCNC: 99 MG/DL (ref 70–105)
HCT VFR BLD CALC: 38 % (ref 35–52)
HGB BLD-MCNC: 12.5 G/DL (ref 11.5–16)
LIPASE SERPL-CCNC: 17 U/L (ref 8–78)
LYMPHOCYTES # BLD AUTO: 1.4 10^3/UL (ref 1–4)
LYMPHOCYTES NFR BLD AUTO: 14 % (ref 12–44)
MANUAL DIFFERENTIAL PERFORMED BLD QL: NO
MCH RBC QN AUTO: 29 PG (ref 25–34)
MCHC RBC AUTO-ENTMCNC: 33 G/DL (ref 32–36)
MCV RBC AUTO: 88 FL (ref 80–99)
MONOCYTES # BLD AUTO: 0.5 10^3/UL (ref 0–1)
MONOCYTES NFR BLD AUTO: 5 % (ref 0–12)
NEUTROPHILS # BLD AUTO: 8.2 10^3/UL (ref 1.8–7.8)
NEUTROPHILS NFR BLD AUTO: 80 % (ref 42–75)
PLATELET # BLD: 290 10^3/UL (ref 130–400)
PMV BLD AUTO: 10.7 FL (ref 9–12.2)
POTASSIUM SERPL-SCNC: 3.7 MMOL/L (ref 3.6–5)
PROT SERPL-MCNC: 7.6 GM/DL (ref 6.4–8.2)
SODIUM SERPL-SCNC: 138 MMOL/L (ref 135–145)
WBC # BLD AUTO: 10.3 10^3/UL (ref 4.3–11)

## 2023-10-01 PROCEDURE — 87636 SARSCOV2 & INF A&B AMP PRB: CPT

## 2023-10-01 PROCEDURE — 81000 URINALYSIS NONAUTO W/SCOPE: CPT

## 2023-10-01 PROCEDURE — 71045 X-RAY EXAM CHEST 1 VIEW: CPT

## 2023-10-01 PROCEDURE — 36415 COLL VENOUS BLD VENIPUNCTURE: CPT

## 2023-10-01 PROCEDURE — 83690 ASSAY OF LIPASE: CPT

## 2023-10-01 PROCEDURE — 80053 COMPREHEN METABOLIC PANEL: CPT

## 2023-10-01 PROCEDURE — 85025 COMPLETE CBC W/AUTO DIFF WBC: CPT

## 2023-10-01 NOTE — ED COUGH/URI
General


Chief Complaint:  Cough/Cold/Flu Symptoms


Stated Complaint:  SOB|VOMITING|FEVER


Source:  patient





History of Present Illness


Date Seen by Provider:  Oct 1, 2023


Time Seen by Provider:  22:20


Initial Comments


28-year-old female presenting with complaints of having upper respiratory 

infection with cough and congestion.  She has been coughing to the point that 

she was vomiting.  She has felt like she had a fever with the highest at 101 

Fahrenheit.  She started getting sick Friday night.  Her 7-month-old started 

getting sick with similar symptoms on Thursday.  She does not have any known ill

contacts.  She was feeling more dizzy and lightheaded tonight and concerned that

she was dehydrated.  She denies having any pain or burning with urination.  She 

has had some small amount of loose stools.


Timing/Duration:  getting worse (since Friday)


Severity/Quality:  moderate, productive cough


Prior Episodes/Possible Cause:  no prior episodes


Modifying Factors:  Worse With Activity, Worse With Coughing


Associated Symptoms:  chest pain/soreness, cough, dizziness, fever/chills, 

headache, lightheadedness, nasal congestion, shortness of breath





Allergies and Home Medications


Allergies


Coded Allergies:  


     azithromycin (Verified  Allergy, Mild, Hives, 12/12/22)


     latex (Verified  Allergy, Mild, Itching, 2/14/23)





Patient Home Medication List


Home Medication List Reviewed:  Yes


Albuterol Sulfate (Ventolin Hfa) 90 Mcg Hfa.aer.ad, 18 GM INH Q4H PRN for 

WHEEZING


   Prescribed by: ABILIO RUIZ on 11/3/22 1129


Docusate Sodium (Docusate Sodium) 100 Mg Capsule, 100 MG PO BID


   Prescribed by: CELINA REHMAN on 2/16/23 1020


Ferrous Sulfate (Ferrous Sulfate) 325 Mg (65 Mg Iron) Tablet, 325 MG PO DAILY, 

(Reported)


   Entered as Reported by: IBRAHIMA DIAZ on 12/12/22 0013


Ibuprofen (Ibu) 600 Mg Tablet, 600 MG PO Q6H


   Prescribed by: CELINA REHMAN on 2/16/23 1020


Ondansetron (Ondansetron Odt) 4 Mg Tab.rapdis, 4 MG PO Q6H PRN for HARSHA

SEA/VOMITING


   Prescribed by: PETE MARTÍNEZ on 10/2/23 0017


Prenatal Vit W-Ca,Fe,FA(<1 mg) (Prenatal Formula) 28 Mg Iron-800 Mcg Tablet, 1 

EACH PO DAILY, (Reported)


   Entered as Reported by: IBRAHIMA DIAZ on 12/12/22 0013





Review of Systems


Review of Systems


Constitutional:  chills, dizziness, fever, weakness


EENTM:  nose congestion


Respiratory:  cough, short of breath


Gastrointestinal:  abdominal pain (Sore from throwing up), diarrhea, nausea, 

vomiting


Genitourinary:  No dysuria


Musculoskeletal:  other (Generalized body aches)


Skin:  No rash


Psychiatric/Neurological:  See HPI





Past Medical-Social-Family Hx


Patient Social History


Tobacco Use?:  No


Use of E-Cig and/or Vaping dev:  No


Substance use?:  No





Immunizations Up To Date


Tetanus Booster (TDap):  Less than 5yrs





Past Medical History


Surgery/Hospitalization HX:  


Hx hyperactive thyroid


Surgeries:  No





Physical Exam





Vital Signs - First Documented








 10/1/23





 22:30


 


Temp 36.8


 


Pulse 142


 


Resp 16


 


B/P (MAP) 118/76 (90)


 


Pulse Ox 96


 


O2 Delivery Room Air





Capillary Refill :


Height: '"


Weight: lbs. oz. kg; 32.21 BMI


Method:


General Appearance:  WD/WN, no apparent distress


HEENT:  PERRL/EOMI; No pharynx normal (Slightly dry mucous membranes)


Neck:  non-tender, full range of motion, supple, normal inspection


Respiratory:  chest non-tender, lungs clear, normal breath sounds, no respirato

ry distress, no accessory muscle use


Cardiovascular:  normal peripheral pulses, tachycardia


Gastrointestinal:  normal bowel sounds, non tender, soft, no pulsatile mass


Extremities:  normal range of motion, non-tender, normal capillary refill


Neurologic/Psychiatric:  alert, oriented x 3


Skin:  normal color, warm/dry





Progress/Results/Core Measures


Suspected Sepsis


SIRS


Temperature: 


Pulse:  


Respiratory Rate: 


 


Laboratory Tests


10/1/23 23:00: White Blood Count 10.3


Blood Pressure  / 


Mean: 


 


Laboratory Tests


10/1/23 23:00: 


Creatinine 0.60, Platelet Count 290, Total Bilirubin 0.9








Results/Orders


Lab Results





Laboratory Tests








Test


 10/1/23


22:57 10/1/23


23:00 10/1/23


23:48 Range/Units


 


 


Influenza Type A (RT-PCR) Not Detected    Not Detecte  


 


Influenza Type B (RT-PCR) Not Detected    Not Detecte  


 


SARS-CoV-2 RNA (RT-PCR) Not Detected    Not Detecte  


 


White Blood Count


 


 10.3 


 


 4.3-11.0


10^3/uL


 


Red Blood Count


 


 4.29 


 


 3.80-5.11


10^6/uL


 


Hemoglobin  12.5   11.5-16.0  g/dL


 


Hematocrit  38   35-52  %


 


Mean Corpuscular Volume  88   80-99  fL


 


Mean Corpuscular Hemoglobin  29   25-34  pg


 


Mean Corpuscular Hemoglobin


Concent 


 33 


 


 32-36  g/dL





 


Red Cell Distribution Width  13.0   10.0-14.5  %


 


Platelet Count


 


 290 


 


 130-400


10^3/uL


 


Mean Platelet Volume  10.7   9.0-12.2  fL


 


Immature Granulocyte % (Auto)  0    %


 


Neutrophils (%) (Auto)  80 H  42-75  %


 


Lymphocytes (%) (Auto)  14   12-44  %


 


Monocytes (%) (Auto)  5   0-12  %


 


Eosinophils (%) (Auto)  0   0-10  %


 


Basophils (%) (Auto)  1   0-10  %


 


Neutrophils # (Auto)


 


 8.2 H


 


 1.8-7.8


10^3/uL


 


Lymphocytes # (Auto)


 


 1.4 


 


 1.0-4.0


10^3/uL


 


Monocytes # (Auto)


 


 0.5 


 


 0.0-1.0


10^3/uL


 


Eosinophils # (Auto)


 


 0.0 


 


 0.0-0.3


10^3/uL


 


Basophils # (Auto)


 


 0.1 


 


 0.0-0.1


10^3/uL


 


Immature Granulocyte # (Auto)


 


 0.0 


 


 0.0-0.1


10^3/uL


 


Sodium Level  138   135-145  MMOL/L


 


Potassium Level  3.7   3.6-5.0  MMOL/L


 


Chloride Level  104     MMOL/L


 


Carbon Dioxide Level  22   21-32  MMOL/L


 


Anion Gap  12   5-14  MMOL/L


 


Blood Urea Nitrogen  8   7-18  MG/DL


 


Creatinine


 


 0.60 


 


 0.60-1.30


MG/DL


 


Estimat Glomerular Filtration


Rate 


 126 


 


  





 


BUN/Creatinine Ratio  13    


 


Glucose Level  99     MG/DL


 


Calcium Level  9.0   8.5-10.1  MG/DL


 


Corrected Calcium  8.8   8.5-10.1  MG/DL


 


Total Bilirubin  0.9   0.1-1.0  MG/DL


 


Aspartate Amino Transf


(AST/SGOT) 


 15 


 


 5-34  U/L





 


Alanine Aminotransferase


(ALT/SGPT) 


 19 


 


 0-55  U/L





 


Alkaline Phosphatase  118     U/L


 


Total Protein  7.6   6.4-8.2  GM/DL


 


Albumin  4.2   3.2-4.5  GM/DL


 


Lipase  17   8-78  U/L


 


Urine Color   YELLOW   


 


Urine Clarity   SL CLOUDY   


 


Urine pH   6.5  5-9  


 


Urine Specific Gravity   1.020  1.016-1.022  


 


Urine Protein   NEGATIVE  NEGATIVE  


 


Urine Glucose (UA)   NEGATIVE  NEGATIVE  


 


Urine Ketones   TRACE H NEGATIVE  


 


Urine Nitrite   NEGATIVE  NEGATIVE  


 


Urine Bilirubin   NEGATIVE  NEGATIVE  


 


Urine Urobilinogen   0.2  < = 1.0  MG/DL


 


Urine Leukocyte Esterase   NEGATIVE  NEGATIVE  


 


Urine RBC (Auto)   TRACE-I H NEGATIVE  


 


Urine RBC   RARE   /HPF


 


Urine WBC   RARE   /HPF


 


Urine Squamous Epithelial


Cells 


 


 2-5 


  /HPF





 


Urine Crystals   NONE   /LPF


 


Urine Bacteria   FEW H  /HPF


 


Urine Casts   NONE   /LPF


 


Urine Mucus   MODERATE H  /LPF


 


Urine Culture Indicated   NO   








My Orders





Orders - PETE MARTÍNEZ MD


Comprehensive Metabolic Panel (10/1/23 22:46)


Lipase (10/1/23 22:46)


Ua Culture If Indicated (10/1/23 22:46)


Ed Iv/Invasive Line Start (10/1/23 22:46)


Cbc And Automated Diff (10/1/23 22:46)


Covid 19 Inhouse Test (10/1/23 22:46)


Chest 1 View Ap/Pa Only (10/1/23 22:46)


Influenza A And B By Pcr (10/1/23 22:46)


Ns Iv 1000 Ml (Ns Iv 1000 Ml) (10/1/23 22:47)


Ondansetron Injection (Ondansetron  Inj (10/1/23 22:47)


Rx-Ondansetron Po (Rx-Zofran Po) (10/2/23 00:26)





Vital Signs/I&O











 10/1/23





 22:30


 


Temp 36.8


 


Pulse 142


 


Resp 16


 


B/P (MAP) 118/76 (90)


 


Pulse Ox 96


 


O2 Delivery Room Air














 10/2/23





 00:00


 


Intake Total 1000 ml


 


Balance 1000 ml





Capillary Refill :


Progress Note #1:  


Progress Note


Differential diagnosis includes COVID, influenza, viral syndrome, pneumonia.





Establish peripheral IV access and send labs for complete blood count, 

comprehensive metabolic profile, lipase.  Urinalysis to look at hydration.  

Administer normal saline 1 L IV fluid bolus for hydration, Zofran 4 mg IV for 

nausea and vomiting


Progress Note #2:  


Progress Note


On my personal interpretation and review of the 1 view chest x-ray she does not 

have any acute infiltrate or acute process.  There were complete blood count 

showed a normal white blood cell count of 10.3.  She was not anemic with a 

hemoglobin of 12.5.  Her son's respiratory panel was negative for flu, COVID, 

RSV.  Her respiratory panel was still pending.  She was able to provide a urine 

specimen after getting some IV fluids here in the ED.





0016 urinalysis was slightly concentrated with specific gravity 1.020.  She had 

trace ketones but did not have nitrites or leukocyte esterase to indicate a UTI.

 Her comprehensive metabolic profile was not showing acute severe dehydration as

she had a normal renal function and no acute significant abnormality with her 

electrolytes.  Awaiting respiratory panel for COVID and influenza.


Progress Note #3:  


Progress Note


Influenza and COVID were negative.  Reassured patient and on recheck of her 

vital signs her heart rate was down to 120 from 140.  Patient states that she 

always has some tachycardia due to hyperthyroidism.  She was feeling better and 

was reassured that the chest x-ray was clear as well as COVID and flu were 

negative.  Reassured about viral syndrome.  Encourage fluids and hydration.  

Given a 4 pack of Zofran ODT to help with nausea.  Send a prescription to the 

pharmacy to continue Zofran as needed nausea and vomiting every 6 hours.





Diagnostic Imaging





   Diagonstic Imaging:  Xray


   Plain Films/CT/US/NM/MRI:  chest


   Reviewed:  Reviewed by Me





Departure


Impression





   Primary Impression:  


   Upper respiratory infection with cough and congestion


   Additional Impressions:  


   Acute viral syndrome


   Dehydration


Disposition:  01 HOME, SELF-CARE


Condition:  Improved





Departure-Patient Inst.


Decision time for Depature:  00:26


Referrals:  


CELINA REHMAN MD (PCP/Family)


Primary Care Physician


Patient Instructions:  Upper Respiratory Infection ED, Dehydration, Adult ED, 

Cough, Adult ED





Add. Discharge Instructions:  


Try to stay well-hydrated and keep sipping on fluids.





Use the dissolving Zofran tablets to help keep the stomach more settled.





Consider using a humidifier or vaporizer at the bedside to help with congestion 

and cough.





All discharge instructions reviewed with patient and/or family. Voiced 

understanding.


Scripts


Ondansetron (Ondansetron Odt) 4 Mg Tab.rapdis


4 MG PO Q6H PRN for NAUSEA/VOMITING for 5 Days, #20 TAB 0 Refills


   Prov: PETE MARTÍNEZ MD         10/2/23


Work/School Note:  Family Work Note   Patient Received Medical Care In the 

Emergency Department On:  Oct 1, 2023


   Patient Will Be Able to Return to Work/School On:  Oct 2, 2023











PETE MARTÍNEZ MD                Oct 1, 2023 23:22

## 2023-10-02 VITALS — DIASTOLIC BLOOD PRESSURE: 72 MMHG | SYSTOLIC BLOOD PRESSURE: 116 MMHG

## 2023-10-02 LAB
APTT PPP: YELLOW S
BACTERIA #/AREA URNS HPF: (no result) /HPF
BILIRUB UR QL STRIP: NEGATIVE
FIBRINOGEN PPP-MCNC: (no result) MG/DL
GLUCOSE UR STRIP-MCNC: NEGATIVE MG/DL
KETONES UR QL STRIP: (no result)
LEUKOCYTE ESTERASE UR QL STRIP: NEGATIVE
NITRITE UR QL STRIP: NEGATIVE
PH UR STRIP: 6.5 [PH] (ref 5–9)
PROT UR QL STRIP: NEGATIVE
RBC #/AREA URNS HPF: (no result) /HPF
SP GR UR STRIP: 1.02 (ref 1.02–1.02)
SQUAMOUS #/AREA URNS HPF: (no result) /HPF
WBC #/AREA URNS HPF: (no result) /HPF

## 2023-10-02 NOTE — DIAGNOSTIC IMAGING REPORT
EXAMINATION: Chest radiograph, portable AP view.



DATE: 10/1/2023 11:10 PM



INDICATION: 28-year-old female, cough and fever.



COMPARISON:  None.



FINDINGS: Heart size and mediastinal contours are unremarkable.

There is no identified pneumothorax. There is no large pleural

effusion. There is no identified focal airspace consolidation.



IMPRESSION: 

1. No identified acute cardiopulmonary abnormality.



Dictated by: 



  Dictated on workstation # [id number]